# Patient Record
Sex: FEMALE | Race: WHITE | NOT HISPANIC OR LATINO | Employment: FULL TIME | URBAN - METROPOLITAN AREA
[De-identification: names, ages, dates, MRNs, and addresses within clinical notes are randomized per-mention and may not be internally consistent; named-entity substitution may affect disease eponyms.]

---

## 2022-12-29 ENCOUNTER — ANESTHESIA EVENT (OUTPATIENT)
Dept: PERIOP | Facility: HOSPITAL | Age: 42
End: 2022-12-29
Payer: COMMERCIAL

## 2022-12-29 ENCOUNTER — OFFICE VISIT (OUTPATIENT)
Dept: ORTHOPEDIC SURGERY | Facility: CLINIC | Age: 42
End: 2022-12-29

## 2022-12-29 ENCOUNTER — LAB (OUTPATIENT)
Dept: LAB | Facility: HOSPITAL | Age: 42
End: 2022-12-29
Payer: COMMERCIAL

## 2022-12-29 VITALS
HEART RATE: 93 BPM | DIASTOLIC BLOOD PRESSURE: 106 MMHG | WEIGHT: 170 LBS | BODY MASS INDEX: 31.28 KG/M2 | SYSTOLIC BLOOD PRESSURE: 156 MMHG | HEIGHT: 62 IN

## 2022-12-29 DIAGNOSIS — S82.61XA CLOSED DISPLACED FRACTURE OF LATERAL MALLEOLUS OF RIGHT FIBULA, INITIAL ENCOUNTER: ICD-10-CM

## 2022-12-29 DIAGNOSIS — S82.61XA CLOSED DISPLACED FRACTURE OF LATERAL MALLEOLUS OF RIGHT FIBULA, INITIAL ENCOUNTER: Primary | ICD-10-CM

## 2022-12-29 DIAGNOSIS — S93.431A ANKLE SYNDESMOSIS DISRUPTION, RIGHT, INITIAL ENCOUNTER: ICD-10-CM

## 2022-12-29 LAB
ABO GROUP BLD: NORMAL
ABO GROUP BLD: NORMAL
ANION GAP SERPL CALCULATED.3IONS-SCNC: 10.6 MMOL/L (ref 5–15)
BASOPHILS # BLD AUTO: 0.06 10*3/MM3 (ref 0–0.2)
BASOPHILS NFR BLD AUTO: 0.6 % (ref 0–1.5)
BLD GP AB SCN SERPL QL: NEGATIVE
BUN SERPL-MCNC: 7 MG/DL (ref 6–20)
BUN/CREAT SERPL: 9.9 (ref 7–25)
CALCIUM SPEC-SCNC: 9.1 MG/DL (ref 8.6–10.5)
CHLORIDE SERPL-SCNC: 102 MMOL/L (ref 98–107)
CO2 SERPL-SCNC: 25.4 MMOL/L (ref 22–29)
CREAT SERPL-MCNC: 0.71 MG/DL (ref 0.57–1)
DEPRECATED RDW RBC AUTO: 49.3 FL (ref 37–54)
EGFRCR SERPLBLD CKD-EPI 2021: 108.3 ML/MIN/1.73
EOSINOPHIL # BLD AUTO: 0.13 10*3/MM3 (ref 0–0.4)
EOSINOPHIL NFR BLD AUTO: 1.2 % (ref 0.3–6.2)
ERYTHROCYTE [DISTWIDTH] IN BLOOD BY AUTOMATED COUNT: 13.2 % (ref 12.3–15.4)
GLUCOSE SERPL-MCNC: 91 MG/DL (ref 65–99)
HCT VFR BLD AUTO: 38.7 % (ref 34–46.6)
HGB BLD-MCNC: 13 G/DL (ref 12–15.9)
IMM GRANULOCYTES # BLD AUTO: 0.11 10*3/MM3 (ref 0–0.05)
IMM GRANULOCYTES NFR BLD AUTO: 1 % (ref 0–0.5)
LYMPHOCYTES # BLD AUTO: 2.72 10*3/MM3 (ref 0.7–3.1)
LYMPHOCYTES NFR BLD AUTO: 25.9 % (ref 19.6–45.3)
MCH RBC QN AUTO: 34.5 PG (ref 26.6–33)
MCHC RBC AUTO-ENTMCNC: 33.6 G/DL (ref 31.5–35.7)
MCV RBC AUTO: 102.7 FL (ref 79–97)
MONOCYTES # BLD AUTO: 0.52 10*3/MM3 (ref 0.1–0.9)
MONOCYTES NFR BLD AUTO: 4.9 % (ref 5–12)
NEUTROPHILS NFR BLD AUTO: 6.98 10*3/MM3 (ref 1.7–7)
NEUTROPHILS NFR BLD AUTO: 66.4 % (ref 42.7–76)
NRBC BLD AUTO-RTO: 0 /100 WBC (ref 0–0.2)
PLATELET # BLD AUTO: 297 10*3/MM3 (ref 140–450)
PMV BLD AUTO: 9.2 FL (ref 6–12)
POTASSIUM SERPL-SCNC: 3.7 MMOL/L (ref 3.5–5.2)
QT INTERVAL: 412 MS
RBC # BLD AUTO: 3.77 10*6/MM3 (ref 3.77–5.28)
RH BLD: POSITIVE
RH BLD: POSITIVE
SODIUM SERPL-SCNC: 138 MMOL/L (ref 136–145)
T&S EXPIRATION DATE: NORMAL
WBC NRBC COR # BLD: 10.52 10*3/MM3 (ref 3.4–10.8)

## 2022-12-29 PROCEDURE — 93010 ELECTROCARDIOGRAM REPORT: CPT | Performed by: INTERNAL MEDICINE

## 2022-12-29 PROCEDURE — 86901 BLOOD TYPING SEROLOGIC RH(D): CPT | Performed by: INTERNAL MEDICINE

## 2022-12-29 PROCEDURE — 86900 BLOOD TYPING SEROLOGIC ABO: CPT | Performed by: INTERNAL MEDICINE

## 2022-12-29 PROCEDURE — 80048 BASIC METABOLIC PNL TOTAL CA: CPT

## 2022-12-29 PROCEDURE — 93005 ELECTROCARDIOGRAM TRACING: CPT | Performed by: INTERNAL MEDICINE

## 2022-12-29 PROCEDURE — 86901 BLOOD TYPING SEROLOGIC RH(D): CPT

## 2022-12-29 PROCEDURE — 86850 RBC ANTIBODY SCREEN: CPT | Performed by: INTERNAL MEDICINE

## 2022-12-29 PROCEDURE — 36415 COLL VENOUS BLD VENIPUNCTURE: CPT

## 2022-12-29 PROCEDURE — 85025 COMPLETE CBC W/AUTO DIFF WBC: CPT

## 2022-12-29 PROCEDURE — 86900 BLOOD TYPING SEROLOGIC ABO: CPT

## 2022-12-29 PROCEDURE — 99204 OFFICE O/P NEW MOD 45 MIN: CPT | Performed by: INTERNAL MEDICINE

## 2022-12-29 RX ORDER — ACETAMINOPHEN 325 MG/1
1000 TABLET ORAL ONCE
Status: CANCELLED | OUTPATIENT
Start: 2022-12-29 | End: 2022-12-29

## 2022-12-29 RX ORDER — PREGABALIN 150 MG/1
150 CAPSULE ORAL ONCE
Status: CANCELLED | OUTPATIENT
Start: 2022-12-29 | End: 2022-12-29

## 2022-12-29 RX ORDER — MELOXICAM 7.5 MG/1
15 TABLET ORAL ONCE
Status: CANCELLED | OUTPATIENT
Start: 2022-12-29 | End: 2022-12-29

## 2022-12-29 NOTE — PROGRESS NOTES
"Subjective:     Patient ID: Nazanin Telles is a 42 y.o. female.    Chief Complaint:    History of Present Illness  Nazanin Telles presents to clinic today for evaluation of right ankle pain and swelling after she slipped [place] [address].  Patient states that she hobbled around for a few days and eventually went to an urgent care.  She was seen in the urgent care yesterday and diagnosed with a lateral malleolar ankle fracture with syndesmosis widening.  She was placed in a cam boot made weightbearing as tolerated and told to follow-up with orthopedics.  She is here today for further evaluation and management.  Patient is quite anxious upon examination and is tearful tachycardic and tachypneic.     Social History     Occupational History   • Not on file   Tobacco Use   • Smoking status: Every Day     Packs/day: 1.00     Types: Cigarettes     Passive exposure: Never   • Smokeless tobacco: Never   Vaping Use   • Vaping Use: Never used   Substance and Sexual Activity   • Alcohol use: Defer   • Drug use: Never   • Sexual activity: Yes      Past Medical History:   Diagnosis Date   • Anxiety    • Disease of thyroid gland      Past Surgical History:   Procedure Laterality Date   • THYROID SURGERY         History reviewed. No pertinent family history.              Objective:  Vitals:    12/29/22 1328   BP: (!) 156/106   BP Location: Right arm   Pulse: 93   Weight: 77.1 kg (170 lb)   Height: 157.5 cm (62\")         12/29/22  1328   Weight: 77.1 kg (170 lb)     Body mass index is 31.09 kg/m².        Right Ankle Exam     Tenderness   The patient is experiencing tenderness in the lateral malleolus.  Swelling: moderate    Range of Motion   Dorsiflexion: abnormal   Plantar flexion: abnormal   Eversion: abnormal   Inversion: abnormal     Muscle Strength   Dorsiflexion:  3/5  Plantar flexion:  3/5  Anterior tibial:  3/5  Posterior tibial:  3/5  Gastrocsoleus:  3/5  Peroneal muscle:  3/5    Other   Erythema: " absent  Scars: absent  Sensation: normal  Pulse: present                Imaging: 3 views of the right ankle taken in the urgent care yesterday were reviewed myself in the office today  Indication: Right ankle pain  Findings: X-rays demonstrate a supination external rotation type fibular fracture with widening of the medial clear space and syndesmosis.  No other acute osseous abnormalities appreciated.  Medial malleolus and posterior malleolus appear intact  Comparative studies: None  XR Ankle 3+ View Right    Result Date: 12/28/2022  1. Slightly displaced oblique lateral malleolus fracture. Lund B. 2. Slight widening of the medial ankle mortise on the AP radiograph only. Possible deltoid ligamentous injury. 3. Lateral ankle soft tissue swelling Signer Name: Matt Davis MD  Signed: 12/28/2022 10:57 AM  Workstation Name: [id number]  [facility]    XR Foot 3+ View Right    Result Date: 12/28/2022  Negative right foot series.  This report was finalized on 12/28/2022 11:05 AM by Dr. Junior Nelson MD.      Assessment:        1. Closed displaced fracture of lateral malleolus of right fibula, initial encounter    2. Ankle syndesmosis disruption, right, initial encounter           Plan:          1. Discussed treatment options at length with patient at today's visit.  I discussed that the patient has sustained an unstable ankle fracture with syndesmosis and medial clear space widening.  I recommended surgical intervention to repair her fibula and possibly her syndesmosis.  I discussed the risk benefits alternatives of surgical fixation including but not limited to infection, bleeding, damage to surrounding structures including arteries veins and nerves, foot weakness foot numbness malunion nonunion implant irritation DVT and potential need for more surgery.  The patient and her  voiced understanding of the risk benefits alternatives of surgical fixation would like to proceed with  surgery.  We will plan for open reduction internal fixation right fibula with possible syndesmosis reconstruction tomorrow at 730.  I will send the patient today for preadmission testing for labs and EKG.  2. Follow up: 2 weeks following surgery      Nazanin Telles was in agreement with plan and had all questions answered.     Medications:  No orders of the defined types were placed in this encounter.      Followup:  No follow-ups on file.    Diagnoses and all orders for this visit:    1. Closed displaced fracture of lateral malleolus of right fibula, initial encounter (Primary)  -     Case Request; Standing  -     acetaminophen (TYLENOL) tablet 975 mg  -     meloxicam (MOBIC) tablet 15 mg  -     pregabalin (LYRICA) capsule 150 mg  -     CBC and Differential; Future  -     Basic metabolic panel; Future  -     Type and screen; Future  -     ECG 12 Lead; Future  -     ceFAZolin (ANCEF) 2 g in sodium chloride 0.9 % 100 mL IVPB  -     Case Request    2. Ankle syndesmosis disruption, right, initial encounter  -     Case Request; Standing  -     acetaminophen (TYLENOL) tablet 975 mg  -     meloxicam (MOBIC) tablet 15 mg  -     pregabalin (LYRICA) capsule 150 mg  -     CBC and Differential; Future  -     Basic metabolic panel; Future  -     Type and screen; Future  -     ECG 12 Lead; Future  -     ceFAZolin (ANCEF) 2 g in sodium chloride 0.9 % 100 mL IVPB  -     Case Request    Other orders  -     Follow Anesthesia Guidelines / Protocol; Future  -     Follow Anesthesia Guidelines / Protocol; Standing  -     Nerve Block; Standing  -     Verify NPO Status; Standing  -     SCD (sequential compression device)- to be placed on patient in Pre-op; Standing  -     Obtain informed consent (if not collected inpatient or PAT); Standing  -     Radiology Technician to Be Present for Intra-Op C-Arm Use; Standing          Dictated utilizing Dragon dictation

## 2022-12-30 ENCOUNTER — APPOINTMENT (OUTPATIENT)
Dept: GENERAL RADIOLOGY | Facility: HOSPITAL | Age: 42
End: 2022-12-30
Payer: COMMERCIAL

## 2022-12-30 ENCOUNTER — ANESTHESIA (OUTPATIENT)
Dept: PERIOP | Facility: HOSPITAL | Age: 42
End: 2022-12-30
Payer: COMMERCIAL

## 2022-12-30 ENCOUNTER — HOSPITAL ENCOUNTER (OUTPATIENT)
Facility: HOSPITAL | Age: 42
Setting detail: HOSPITAL OUTPATIENT SURGERY
Discharge: HOME | End: 2022-12-30
Attending: INTERNAL MEDICINE | Admitting: INTERNAL MEDICINE
Payer: COMMERCIAL

## 2022-12-30 VITALS
RESPIRATION RATE: 20 BRPM | BODY MASS INDEX: 36.29 KG/M2 | TEMPERATURE: 97.6 F | WEIGHT: 197.2 LBS | HEIGHT: 62 IN | HEART RATE: 78 BPM | SYSTOLIC BLOOD PRESSURE: 103 MMHG | DIASTOLIC BLOOD PRESSURE: 83 MMHG | OXYGEN SATURATION: 98 %

## 2022-12-30 DIAGNOSIS — S82.61XA CLOSED DISPLACED FRACTURE OF LATERAL MALLEOLUS OF RIGHT FIBULA, INITIAL ENCOUNTER: ICD-10-CM

## 2022-12-30 DIAGNOSIS — S93.431A ANKLE SYNDESMOSIS DISRUPTION, RIGHT, INITIAL ENCOUNTER: ICD-10-CM

## 2022-12-30 LAB — HCG SERPL QL: NEGATIVE

## 2022-12-30 PROCEDURE — C1713 ANCHOR/SCREW BN/BN,TIS/BN: HCPCS | Performed by: INTERNAL MEDICINE

## 2022-12-30 PROCEDURE — 25010000002 PROPOFOL 1000 MG/100ML EMULSION: Performed by: NURSE ANESTHETIST, CERTIFIED REGISTERED

## 2022-12-30 PROCEDURE — 76942 ECHO GUIDE FOR BIOPSY: CPT | Performed by: INTERNAL MEDICINE

## 2022-12-30 PROCEDURE — 25010000002 DEXAMETHASONE PER 1 MG: Performed by: NURSE ANESTHETIST, CERTIFIED REGISTERED

## 2022-12-30 PROCEDURE — S0260 H&P FOR SURGERY: HCPCS | Performed by: INTERNAL MEDICINE

## 2022-12-30 PROCEDURE — 25010000002 MIDAZOLAM PER 1MG: Performed by: NURSE ANESTHETIST, CERTIFIED REGISTERED

## 2022-12-30 PROCEDURE — 84703 CHORIONIC GONADOTROPIN ASSAY: CPT | Performed by: INTERNAL MEDICINE

## 2022-12-30 PROCEDURE — 27829 TREAT LOWER LEG JOINT: CPT | Performed by: INTERNAL MEDICINE

## 2022-12-30 PROCEDURE — 27792 TREATMENT OF ANKLE FRACTURE: CPT | Performed by: INTERNAL MEDICINE

## 2022-12-30 PROCEDURE — 27829 TREAT LOWER LEG JOINT: CPT | Performed by: SPECIALIST/TECHNOLOGIST, OTHER

## 2022-12-30 PROCEDURE — 25010000002 ONDANSETRON PER 1 MG: Performed by: NURSE ANESTHETIST, CERTIFIED REGISTERED

## 2022-12-30 PROCEDURE — 25010000002 EPINEPHRINE (ANAPHYLAXIS) 1 MG/ML SOLUTION: Performed by: NURSE ANESTHETIST, CERTIFIED REGISTERED

## 2022-12-30 PROCEDURE — 0 CEFAZOLIN SODIUM-DEXTROSE 2-3 GM-%(50ML) RECONSTITUTED SOLUTION: Performed by: INTERNAL MEDICINE

## 2022-12-30 PROCEDURE — 73600 X-RAY EXAM OF ANKLE: CPT

## 2022-12-30 DEVICE — EVOS 3.5MM X 10MM CORTEX SCREW SELF-TAPPING
Type: IMPLANTABLE DEVICE | Site: ANKLE | Status: FUNCTIONAL
Brand: EVOS

## 2022-12-30 DEVICE — EVOS 3.5MM LATERAL DISTAL FIBULA                                    PLATE 7 HOLE RIGHT 103MM
Type: IMPLANTABLE DEVICE | Site: ANKLE | Status: FUNCTIONAL
Brand: EVOS

## 2022-12-30 DEVICE — EVOS 3.5MM X 14MM LOCKING SCREW SELF-TAPPING
Type: IMPLANTABLE DEVICE | Site: ANKLE | Status: FUNCTIONAL
Brand: EVOS

## 2022-12-30 DEVICE — EVOS 3.5MM X 10MM LOCKING SCREW SELF-TAPPING
Type: IMPLANTABLE DEVICE | Site: ANKLE | Status: FUNCTIONAL
Brand: EVOS

## 2022-12-30 DEVICE — EVOS 3.5MM X 12MM CORTEX SCREW SELF-TAPPING
Type: IMPLANTABLE DEVICE | Site: ANKLE | Status: FUNCTIONAL
Brand: EVOS

## 2022-12-30 DEVICE — EVOS 3.5MM X 16MM LOCKING SCREW SELF-TAPPING
Type: IMPLANTABLE DEVICE | Site: ANKLE | Status: FUNCTIONAL
Brand: EVOS

## 2022-12-30 DEVICE — KIT INVISIKNOT ANKLE FRACTURE
Type: IMPLANTABLE DEVICE | Site: ANKLE | Status: FUNCTIONAL
Brand: INVISIKNOT

## 2022-12-30 RX ORDER — BUPIVACAINE HYDROCHLORIDE 5 MG/ML
INJECTION, SOLUTION EPIDURAL; INTRACAUDAL
Status: COMPLETED | OUTPATIENT
Start: 2022-12-30 | End: 2022-12-30

## 2022-12-30 RX ORDER — AMOXICILLIN AND CLAVULANATE POTASSIUM 562.5; 437.5; 62.5 MG/1; MG/1; MG/1
2 TABLET, FILM COATED, EXTENDED RELEASE ORAL 2 TIMES DAILY
Qty: 40 TABLET | Refills: 0 | Status: SHIPPED | OUTPATIENT
Start: 2022-12-30 | End: 2022-12-30 | Stop reason: SDUPTHER

## 2022-12-30 RX ORDER — CEFAZOLIN SODIUM 2 G/50ML
2 SOLUTION INTRAVENOUS ONCE
Status: COMPLETED | OUTPATIENT
Start: 2022-12-30 | End: 2022-12-30

## 2022-12-30 RX ORDER — DOCUSATE SODIUM 250 MG
250 CAPSULE ORAL DAILY
Qty: 30 CAPSULE | Refills: 0 | Status: SHIPPED | OUTPATIENT
Start: 2022-12-30 | End: 2023-01-31

## 2022-12-30 RX ORDER — DEXMEDETOMIDINE HYDROCHLORIDE 100 UG/ML
INJECTION, SOLUTION INTRAVENOUS AS NEEDED
Status: DISCONTINUED | OUTPATIENT
Start: 2022-12-30 | End: 2022-12-30 | Stop reason: SURG

## 2022-12-30 RX ORDER — GLYCOPYRROLATE 0.2 MG/ML
INJECTION INTRAMUSCULAR; INTRAVENOUS AS NEEDED
Status: DISCONTINUED | OUTPATIENT
Start: 2022-12-30 | End: 2022-12-30 | Stop reason: SURG

## 2022-12-30 RX ORDER — PREGABALIN 75 MG/1
150 CAPSULE ORAL ONCE
Status: COMPLETED | OUTPATIENT
Start: 2022-12-30 | End: 2022-12-30

## 2022-12-30 RX ORDER — ACETAMINOPHEN 500 MG
1000 TABLET ORAL ONCE
Status: COMPLETED | OUTPATIENT
Start: 2022-12-30 | End: 2022-12-30

## 2022-12-30 RX ORDER — LIDOCAINE HYDROCHLORIDE 10 MG/ML
0.5 INJECTION, SOLUTION EPIDURAL; INFILTRATION; INTRACAUDAL; PERINEURAL ONCE AS NEEDED
Status: DISCONTINUED | OUTPATIENT
Start: 2022-12-30 | End: 2022-12-30 | Stop reason: HOSPADM

## 2022-12-30 RX ORDER — LIDOCAINE HYDROCHLORIDE 20 MG/ML
INJECTION, SOLUTION INTRAVENOUS AS NEEDED
Status: DISCONTINUED | OUTPATIENT
Start: 2022-12-30 | End: 2022-12-30 | Stop reason: SURG

## 2022-12-30 RX ORDER — LEVOTHYROXINE SODIUM 112 UG/1
112 TABLET ORAL DAILY
Qty: 30 TABLET | Refills: 0 | Status: SHIPPED | OUTPATIENT
Start: 2022-12-30 | End: 2023-01-31 | Stop reason: SDUPTHER

## 2022-12-30 RX ORDER — KETAMINE HYDROCHLORIDE 10 MG/ML
INJECTION INTRAMUSCULAR; INTRAVENOUS AS NEEDED
Status: DISCONTINUED | OUTPATIENT
Start: 2022-12-30 | End: 2022-12-30 | Stop reason: SURG

## 2022-12-30 RX ORDER — AMOXICILLIN AND CLAVULANATE POTASSIUM 562.5; 437.5; 62.5 MG/1; MG/1; MG/1
2 TABLET, FILM COATED, EXTENDED RELEASE ORAL 2 TIMES DAILY
Qty: 40 TABLET | Refills: 0 | Status: SHIPPED | OUTPATIENT
Start: 2022-12-30 | End: 2023-01-31

## 2022-12-30 RX ORDER — MIDAZOLAM HYDROCHLORIDE 2 MG/2ML
1 INJECTION, SOLUTION INTRAMUSCULAR; INTRAVENOUS
Status: COMPLETED | OUTPATIENT
Start: 2022-12-30 | End: 2022-12-30

## 2022-12-30 RX ORDER — FAMOTIDINE 10 MG/ML
20 INJECTION, SOLUTION INTRAVENOUS
Status: COMPLETED | OUTPATIENT
Start: 2022-12-30 | End: 2022-12-30

## 2022-12-30 RX ORDER — MELOXICAM 7.5 MG/1
15 TABLET ORAL ONCE
Status: COMPLETED | OUTPATIENT
Start: 2022-12-30 | End: 2022-12-30

## 2022-12-30 RX ORDER — OXYCODONE HYDROCHLORIDE AND ACETAMINOPHEN 5; 325 MG/1; MG/1
1 TABLET ORAL ONCE AS NEEDED
Status: DISCONTINUED | OUTPATIENT
Start: 2022-12-30 | End: 2022-12-30 | Stop reason: HOSPADM

## 2022-12-30 RX ORDER — ONDANSETRON 2 MG/ML
4 INJECTION INTRAMUSCULAR; INTRAVENOUS ONCE AS NEEDED
Status: COMPLETED | OUTPATIENT
Start: 2022-12-30 | End: 2022-12-30

## 2022-12-30 RX ORDER — DEXAMETHASONE SODIUM PHOSPHATE 4 MG/ML
8 INJECTION, SOLUTION INTRA-ARTICULAR; INTRALESIONAL; INTRAMUSCULAR; INTRAVENOUS; SOFT TISSUE ONCE AS NEEDED
Status: COMPLETED | OUTPATIENT
Start: 2022-12-30 | End: 2022-12-30

## 2022-12-30 RX ORDER — ONDANSETRON 2 MG/ML
4 INJECTION INTRAMUSCULAR; INTRAVENOUS ONCE AS NEEDED
Status: DISCONTINUED | OUTPATIENT
Start: 2022-12-30 | End: 2022-12-30 | Stop reason: HOSPADM

## 2022-12-30 RX ORDER — EPINEPHRINE 1 MG/ML
INJECTION, SOLUTION INTRAMUSCULAR; SUBCUTANEOUS AS NEEDED
Status: DISCONTINUED | OUTPATIENT
Start: 2022-12-30 | End: 2022-12-30 | Stop reason: SURG

## 2022-12-30 RX ORDER — SODIUM CHLORIDE 0.9 % (FLUSH) 0.9 %
10 SYRINGE (ML) INJECTION EVERY 12 HOURS SCHEDULED
Status: DISCONTINUED | OUTPATIENT
Start: 2022-12-30 | End: 2022-12-30 | Stop reason: HOSPADM

## 2022-12-30 RX ORDER — FENTANYL CITRATE 50 UG/ML
50 INJECTION, SOLUTION INTRAMUSCULAR; INTRAVENOUS
Status: DISCONTINUED | OUTPATIENT
Start: 2022-12-30 | End: 2022-12-30 | Stop reason: HOSPADM

## 2022-12-30 RX ORDER — SODIUM CHLORIDE, SODIUM LACTATE, POTASSIUM CHLORIDE, CALCIUM CHLORIDE 600; 310; 30; 20 MG/100ML; MG/100ML; MG/100ML; MG/100ML
9 INJECTION, SOLUTION INTRAVENOUS CONTINUOUS PRN
Status: DISCONTINUED | OUTPATIENT
Start: 2022-12-30 | End: 2022-12-30 | Stop reason: HOSPADM

## 2022-12-30 RX ORDER — SODIUM CHLORIDE 9 MG/ML
40 INJECTION, SOLUTION INTRAVENOUS AS NEEDED
Status: DISCONTINUED | OUTPATIENT
Start: 2022-12-30 | End: 2022-12-30 | Stop reason: HOSPADM

## 2022-12-30 RX ORDER — CHOLECALCIFEROL (VITAMIN D3) 125 MCG
20 CAPSULE ORAL NIGHTLY
COMMUNITY

## 2022-12-30 RX ORDER — SODIUM CHLORIDE 0.9 % (FLUSH) 0.9 %
10 SYRINGE (ML) INJECTION AS NEEDED
Status: DISCONTINUED | OUTPATIENT
Start: 2022-12-30 | End: 2022-12-30 | Stop reason: HOSPADM

## 2022-12-30 RX ORDER — ASPIRIN 81 MG/1
81 TABLET ORAL 2 TIMES DAILY
Qty: 60 TABLET | Refills: 0 | Status: SHIPPED | OUTPATIENT
Start: 2022-12-30 | End: 2023-01-31

## 2022-12-30 RX ORDER — CYCLOBENZAPRINE HCL 5 MG
5 TABLET ORAL 3 TIMES DAILY PRN
Qty: 30 TABLET | Refills: 0 | Status: SHIPPED | OUTPATIENT
Start: 2022-12-30 | End: 2023-01-12

## 2022-12-30 RX ORDER — PROPOFOL 10 MG/ML
INJECTION, EMULSION INTRAVENOUS CONTINUOUS PRN
Status: DISCONTINUED | OUTPATIENT
Start: 2022-12-30 | End: 2022-12-30 | Stop reason: SURG

## 2022-12-30 RX ORDER — OXYCODONE HYDROCHLORIDE AND ACETAMINOPHEN 5; 325 MG/1; MG/1
1-2 TABLET ORAL EVERY 4 HOURS PRN
Qty: 45 TABLET | Refills: 0 | Status: SHIPPED | OUTPATIENT
Start: 2022-12-30 | End: 2023-01-06 | Stop reason: SDUPTHER

## 2022-12-30 RX ORDER — MAGNESIUM HYDROXIDE 1200 MG/15ML
LIQUID ORAL AS NEEDED
Status: DISCONTINUED | OUTPATIENT
Start: 2022-12-30 | End: 2022-12-30 | Stop reason: HOSPADM

## 2022-12-30 RX ADMIN — MIDAZOLAM HYDROCHLORIDE 1 MG: 1 INJECTION, SOLUTION INTRAMUSCULAR; INTRAVENOUS at 07:09

## 2022-12-30 RX ADMIN — KETAMINE HYDROCHLORIDE 10 MG: 10 INJECTION INTRAMUSCULAR; INTRAVENOUS at 07:50

## 2022-12-30 RX ADMIN — FAMOTIDINE 20 MG: 10 INJECTION, SOLUTION INTRAVENOUS at 07:05

## 2022-12-30 RX ADMIN — DEXAMETHASONE SODIUM PHOSPHATE 8 MG: 4 INJECTION, SOLUTION INTRAMUSCULAR; INTRAVENOUS at 07:04

## 2022-12-30 RX ADMIN — PROPOFOL INJECTABLE EMULSION 80 MCG/KG/MIN: 10 INJECTION, EMULSION INTRAVENOUS at 07:37

## 2022-12-30 RX ADMIN — BUPIVACAINE HYDROCHLORIDE 20 ML: 5 INJECTION, SOLUTION EPIDURAL; INTRACAUDAL; PERINEURAL at 07:12

## 2022-12-30 RX ADMIN — ACETAMINOPHEN 1000 MG: 500 TABLET, FILM COATED ORAL at 06:19

## 2022-12-30 RX ADMIN — GLYCOPYRROLATE 0.1 MG: 0.2 INJECTION INTRAMUSCULAR; INTRAVENOUS at 07:48

## 2022-12-30 RX ADMIN — SODIUM CHLORIDE, POTASSIUM CHLORIDE, SODIUM LACTATE AND CALCIUM CHLORIDE 9 ML/HR: 600; 310; 30; 20 INJECTION, SOLUTION INTRAVENOUS at 07:05

## 2022-12-30 RX ADMIN — MIDAZOLAM HYDROCHLORIDE 1 MG: 1 INJECTION, SOLUTION INTRAMUSCULAR; INTRAVENOUS at 07:06

## 2022-12-30 RX ADMIN — KETAMINE HYDROCHLORIDE 10 MG: 10 INJECTION INTRAMUSCULAR; INTRAVENOUS at 07:12

## 2022-12-30 RX ADMIN — MELOXICAM 15 MG: 7.5 TABLET ORAL at 06:19

## 2022-12-30 RX ADMIN — PREGABALIN 150 MG: 75 CAPSULE ORAL at 06:19

## 2022-12-30 RX ADMIN — LIDOCAINE HYDROCHLORIDE 2 ML: 20 INJECTION, SOLUTION INTRAVENOUS at 07:22

## 2022-12-30 RX ADMIN — BUPIVACAINE HYDROCHLORIDE 10 ML: 5 INJECTION, SOLUTION EPIDURAL; INTRACAUDAL at 07:24

## 2022-12-30 RX ADMIN — LIDOCAINE HYDROCHLORIDE 2 ML: 20 INJECTION, SOLUTION INTRAVENOUS at 07:12

## 2022-12-30 RX ADMIN — CEFAZOLIN SODIUM 2 G: 2 SOLUTION INTRAVENOUS at 07:37

## 2022-12-30 RX ADMIN — KETAMINE HYDROCHLORIDE 10 MG: 10 INJECTION INTRAMUSCULAR; INTRAVENOUS at 07:37

## 2022-12-30 RX ADMIN — DEXMEDETOMIDINE 50 MCG: 100 INJECTION, SOLUTION, CONCENTRATE INTRAVENOUS at 07:24

## 2022-12-30 RX ADMIN — EPINEPHRINE 75 MCG: 1 INJECTION INTRAMUSCULAR; INTRAVENOUS; SUBCUTANEOUS at 07:24

## 2022-12-30 RX ADMIN — ONDANSETRON 4 MG: 2 INJECTION INTRAMUSCULAR; INTRAVENOUS at 07:04

## 2022-12-30 RX ADMIN — DEXMEDETOMIDINE 8 MCG: 100 INJECTION, SOLUTION, CONCENTRATE INTRAVENOUS at 07:56

## 2022-12-30 NOTE — ANESTHESIA PREPROCEDURE EVALUATION
Anesthesia Evaluation     Patient summary reviewed and Nursing notes reviewed   no history of anesthetic complications:  NPO Solid Status: > 8 hours  NPO Liquid Status: < 2 hours           Airway   Mallampati: II  TM distance: <3 FB  Neck ROM: full  No difficulty expected  Dental - normal exam     Pulmonary - normal exam   (+) a smoker Current Smoked day of surgery,   (-) shortness of breath  Cardiovascular - normal exam  Exercise tolerance: good (4-7 METS)    ECG reviewed    (-) angina      Neuro/Psych  (+) psychiatric history Anxiety,    GI/Hepatic/Renal/Endo    (+)  GERD well controlled,  diabetes mellitus gestational, thyroid problem hypothyroidism    Musculoskeletal (-) negative ROS    Abdominal    Substance History   (+) alcohol use, drug use (MJ use )     OB/GYN negative ob/gyn ROS         Other                        Anesthesia Plan    ASA 2     general with block     intravenous induction     Anesthetic plan, risks, benefits, and alternatives have been provided, discussed and informed consent has been obtained with: patient.    Use of blood products discussed with patient  Consented to blood products.       CODE STATUS:

## 2022-12-30 NOTE — ANESTHESIA POSTPROCEDURE EVALUATION
Patient: Nazanin Telles    Procedure Summary     Date: 12/30/22 Room / Location:  [facility] /  [facility]    Anesthesia Start: 0730 Anesthesia Stop: 0854    Procedure: ANKLE OPEN REDUCTION INTERNAL FIXATION, possible syndesmosis fixation, all associated procedures (Right: Ankle) Diagnosis:       Closed displaced fracture of lateral malleolus of right fibula, initial encounter      Ankle syndesmosis disruption, right, initial encounter      (Closed displaced fracture of lateral malleolus of right fibula, initial encounter [S82.61XA])      (Ankle syndesmosis disruption, right, initial encounter [S93.431A])    Surgeons: Ross Sol MD Provider: Goyo Ann CRNA    Anesthesia Type: general with block ASA Status: 2          Anesthesia Type: general with block    Vitals  Vitals Value Taken Time   BP     Temp 97.6 °F (36.4 °C) 12/30/22 0852   Pulse     Resp     SpO2             Post Anesthesia Care and Evaluation    Patient location during evaluation: bedside  Patient participation: complete - patient participated  Level of consciousness: awake and alert  Pain score: 0  Pain management: adequate    Airway patency: patent  Anesthetic complications: No anesthetic complications  PONV Status: none  Cardiovascular status: acceptable  Respiratory status: acceptable  Hydration status: acceptable

## 2022-12-30 NOTE — H&P
[facility]   HISTORY AND PHYSICAL    Patient Name: Nazanin Telles  : 1980  MRN: [id number]  Primary Care Physician:  Carlos Mendez MD  Date of admission: 2022    Subjective   Subjective     Chief Complaint: r ankle pain    History of Present Illness  Ms. Telles is a anxious 42-year-old female who [place] a [place] ankle [place] [address] when she slipped on the ice.  She states she tried to treat her ankle injury conservatively however 2 days ago the pain and swelling got so severe that they decided to go to an urgent care for an x-ray.  She was diagnosed with an unstable ankle fracture she was placed in a cam boot and sent to my office yesterday.  She was evaluated by me in the office and due to her fracture morphology and widening of her syndesmosis and medial clear space the decision was made to proceed with surgery.  She is here today for surgical intervention of her right ankle fracture.      Review of Systems   Musculoskeletal: Positive for arthralgias, gait problem and joint swelling.   Skin: Positive for color change.   Psychiatric/Behavioral: The patient is nervous/anxious.    All other systems reviewed and are negative.       Personal History     Past Medical History:   Diagnosis Date   • Anxiety    • Disease of thyroid gland        Past Surgical History:   Procedure Laterality Date   • THYROID SURGERY         Family History: family history is not on file. Otherwise pertinent FHx was reviewed and not pertinent to current issue.    Social History:  reports that she has been smoking cigarettes. She has been smoking an average of 1 pack per day. She has never been exposed to tobacco smoke. She has never used smokeless tobacco. Alcohol use questions deferred to the physician. She reports that she does not use drugs.    Home Medications:  HYDROcodone-acetaminophen, hydrOXYzine, levothyroxine, and melatonin    Allergies:  No Known Allergies    Objective    Objective      Vitals:   Temp:  [98.2 °F (36.8 °C)] 98.2 °F (36.8 °C)  Heart Rate:  [78-93] 78  Resp:  [20] 20  BP: (125-156)/() 125/81    Physical Exam   Right Ankle Exam      Tenderness   The patient is experiencing tenderness in the lateral malleolus.  Swelling: moderate     Range of Motion   Dorsiflexion: abnormal   Plantar flexion: abnormal   Eversion: abnormal   Inversion: abnormal      Muscle Strength   Dorsiflexion:  3/5  Plantar flexion:  3/5  Anterior tibial:  3/5  Posterior tibial:  3/5  Gastrocsoleus:  3/5  Peroneal muscle:  3/5     Other   Erythema: absent  Scars: absent  Sensation: normal  Pulse: present       Result Review    Result Review:  I have personally reviewed the results from the time of this admission to 12/30/2022 06:43 EST and agree with these findings:  [x]  Laboratory list / accordion  []  Microbiology  []  Radiology  []  EKG/Telemetry   []  Cardiology/Vascular   []  Pathology  []  Old records  [x]  Other:  Most notable findings include: 3 views of the right ankle show a supination external rotation type fibular fracture with widening of the syndesmosis lateral migration of the talus on the tibial plafond and widening of the medial clear space.      Assessment & Plan   Assessment / Plan     Brief Patient Summary:  Nazanin Telles is a 42 y.o. female who sustained an unstable ankle fracture.    Active Hospital Problems:  Active Hospital Problems    Diagnosis    • Closed displaced fracture of lateral malleolus of right fibula    • Ankle syndesmosis disruption, right, initial encounter      Plan:   1. Discussed treatment options at length with the patient she has has sustained an unstable ankle fracture with syndesmosis and medial clear space widening.  I recommended surgical intervention to repair her fibula and possibly her syndesmosis.  I discussed the risk benefits alternatives of surgical fixation including but not limited to infection, bleeding, damage to surrounding structures  including arteries veins and nerves, foot weakness foot numbness malunion nonunion implant irritation DVT and potential need for more surgery.  The patient and her  voiced understanding of the risk benefits alternatives of surgical fixation would like to proceed with surgery.  We will plan for open reduction internal fixation right fibula with possible syndesmosis reconstruction this morning.  Pre op block by anesthesia.  Plan for post op discharge today.     DVT prophylaxis:  Mechanical DVT prophylaxis orders are present.    CODE STATUS:       Admission Status:  I believe this patient meets outpatient status.    Ross Sol MD

## 2022-12-30 NOTE — ANESTHESIA PROCEDURE NOTES
Peripheral Block      Patient reassessed immediately prior to procedure    Patient location during procedure: pre-op  Start time: 12/30/2022 7:22 AM  Stop time: 12/30/2022 7:24 AM  Reason for block: at surgeon's request and post-op pain management  Performed by  CRNA/CAA: Goyo Ann CRNA  Preanesthetic Checklist  Completed: patient identified, IV checked, site marked, risks and benefits discussed, surgical consent, monitors and equipment checked, pre-op evaluation and timeout performed  Prep:  Pt Position: supine  Sterile barriers:cap, gloves, gown, mask and sterile barriers  Prep: ChloraPrep  Patient monitoring: blood pressure monitoring, continuous pulse oximetry and EKG  Procedure    Sedation: yes  Performed under: local infiltration  Guidance:ultrasound guided    ULTRASOUND INTERPRETATION.  Using ultrasound guidance a 21 G gauge needle was placed in close proximity to the nerve, at which point, under ultrasound guidance anesthetic was injected in the area of the nerve and spread of the anesthesia was seen on ultrasound in close proximity thereto.  There were no abnormalities seen on ultrasound; a digital image was taken; and the patient tolerated the procedure with no complications. Images:still images obtained, printed/placed on chart    Laterality:right  Block Type:adductor canal block  Injection Technique:single-shot  Needle Type:echogenic  Needle Gauge:21 G  Resistance on Injection: none    Medications Used: bupivacaine PF (MARCAINE) injection 0.5% - Injection   10 mL - 12/30/2022 7:24:00 AM      Medications  Preservative Free Saline:5ml    Post Assessment  Injection Assessment: negative aspiration for heme, no paresthesia on injection and incremental injection  Patient Tolerance:comfortable throughout block  Complications:no

## 2022-12-30 NOTE — DISCHARGE INSTRUCTIONS
Post Operative Ankle Surgery Orders/Instructions:     I. ACTIVITIES:  1. Exercises/Activities of Daily Living:  Strict NON-WEIGHTBEARING status.  Use crutches or walker for mobilization  Ice and elevate the operative extremity above level of heart  No tub baths, hot tubs, or swimming pools  Your surgeon will discuss with you when you will be able to drive again.  Everyone that comes near you should wash their hands  Avoid sick people.     IV. INCISION CARE:  Do NOT remove the dressing  Keep dressing DRY  No creams or ointments to the incision  Check dressing every day and notify surgeon immediately:  If any significant drainage   Increase in swelling of the extremity  Increase in pain  Increase in overall body temperature (greater than 100.5 degrees)    V. Medications:   1. Stool Softeners: You will be at greater risk of constipation after surgery due to being less mobile and the pain medications.   Take stool softeners as instructed by your surgeon while on pain medications. Over the counter Colace 100 mg 1-2 capsules twice daily.   If stools become too loose or too frequent, please decreases the dosage or stop the stool softener.  If constipation occurs despite use of stool softeners, you are to continue the stool softeners and add a laxative (Milk of Magnesia 1 ounce daily as needed)  Drink plenty of fluids, and eat fruits and vegetables during your recovery time        3. Pain Medications utilized after surgery are narcotics and the law requires that the following information be given to all patients that are prescribed narcotics:  CLASSIFICATION: Pain medications are called Opioids and are narcotics  LEGALITIES: It is illegal to share narcotics with others and to drive within 24 hours of taking narcotics  POTENTIAL SIDE EFFECTS: Potential side effects of opioids include: nausea, vomiting, itching, dizziness, drowsiness, dry mouth, constipation, and difficulty urinating.  POTENTIAL ADVERSE EFFECTS:   Opioid  tolerance can develop with use of pain medications and this simply means that it requires more and more of the medication to control pain; however, this is seen more in patients that use opioids for longer periods of time.  Opioid dependence can develop with use of Opioids and this simply means that to stop the medication can cause withdrawal symptoms; however, this is seen with patients that use Opioids for longer periods of time.  Opioid addiction can develop with use of Opioids and the incidence of this is very unlikely in patients who take the medications as ordered and stop the medications as instructed.  Opioid overdose can be dangerous, but is unlikely when the medication is taken as ordered and stopped when ordered. It is important not to mix opioids with alcohol or with and type of sedative such as Benadryl as this can lead to over sedation and respiratory difficulty.  DOSAGE:   Pain medications will need to be taken consistently for the first week to decrease pain and promote adequate pain relief and participation in physical therapy.  After the initial surgical pain begins to resolve, you may begin to decrease the pain medication. By the end of 6 weeks, you should be off of pain medications.  Refills will not be given by the office during evening hours, on weekends, or after 12 weeks post-op.  To seek refills on pain medications during the initial 6 week post-operative period, you must call the office 48 hours in advance to request the refill. The office will then notify you when to  the prescription. DO NOT wait until you are out of the medication to request a refill.      V. FOLLOW-UP VISITS:  You will need to follow up in the office with your surgeon in 10-14 days. Please call this number [phone] to schedule this appointment.  If you have any concerns or suspected complications prior to your follow up visit, please call your surgeons office. Do not wait until your appointment time if you  suspect complications. These will need to be addressed in the office promptly.

## 2022-12-30 NOTE — OP NOTE
Date of Operation: 12/30/22     PREOPERATIVE DIAGNOSIS:   Right distal fibula fracture  Right ankle syndesmosis disruption      POSTOPERATIVE DIAGNOSIS:   Right distal fibula fracture  Right ankle syndesmosis disruption         PROCEDURE PERFORMED:   Open reduction, internal fixation of right distal fibula fracture.    Repair/reconstruction syndesmosis right ankle     SURGEON:   Ross Sol MD     ASSISTANT: Chad Osborn was responsible for performing the following activities: Retraction, Closing and Placing Dressing and their skilled assistance was necessary for the success of this case.      ANESTHESIA: General endotracheal anesthesia with regional block.       ESTIMATED BLOOD LOSS:  minimal       FLUIDS: Per anesthesia.       COMPLICATIONS: None.       SPECIMENS: None.       DRAINS: None.     No Tourniquet Times Documented      IMPLANTS:   Smith and Nephew 7 hole lateral anatomic plate  Cali and Nephew syndesmotic tight rope     INDICATIONS FOR PROCEDURE: The patient is a pleasant 42 y.o. female with significant history of right lateral malleolus fracture sustained on 12/24/22 while walking and she slipped on the ice. I discussed treatment options available to the patient including closed treatment versus open reduction, internal fixation, and patient wished to proceed with surgical treatment given displacement of the fracture site, as well as concerns for nonunion. I explained details of the procedure, as well as the risks, benefits, and alternatives as documented on history and physical, and the patient had all questions answered prior to signing the operative consent form. No guarantees were given in regard to results of the surgery.       DESCRIPTION OF PROCEDURE: The patient was seen, evaluated, and cleared for surgery by anesthesia. Admitted in the preoperative holding area. The operative site was marked, consent was reviewed, history and physical was updated, and preoperative labs were reviewed. A  regional block was then placed per anesthesia. The patient was then taken to the operating room and placed in a supine position on a regular OR table. After successful intubation per anesthesia, nonsterile tourniquet was applied to operative extremity.  All bony prominence is well-padded and patient was secured to the table with a waist strap. The right lower extremity was then sterilely prepped and draped in a standard fashion.       A formal timeout was completed, including confirmation of History and Physical, operative consent, surgical site, patient identification number, and preoperative antibiotic administration. The right lower extremity was exsanguinated using an Esmarch and the tourniquet was inflated to 250 mmHG.  The procedure was then begun with an 10 cm incision over the lateral aspect of the right distal fibula centered over the fracture site with a #15 blade. Careful dissection was carried through the subcutaneous tissue with Metzenbaum scissors until the periosteal layer of the fibula was identified at this point in time.  Fracture site was identified at this point time and tentative reduction obtained with pointed reduction and lobster-claw clamps. A K wire was then placed in bicortical fashion perpendicular to the fracture site, securing initial fixation of the fracture in anatomic position. A 7-hole smith and nephew anatomic distal fibula locking plate was chosen and applied to the lateral aspect of the distal fibula, with position confirmed to be appropriate under C-arm fluoroscopy as well as acceptable reduction of the fracture. One cortical screw was placed in bicortical fashion proximal to the fracture site. The plate was clamped to the distal portion of the distal fibula, and a locking screw was placed distal to the fracture site. Additional locking screws were placed distal to the fracture site and cortical screws proximal to the fracture at this point in time.  Reduction and stability at  the fracture site were once again assessed under direct visualization as well as under stress with gentle range of motion of ankle.  Stress examination of syndesmosis under fluoroscopy was also completed this point in time which demonstrated widening of the syndesmosis.    We then made a small poke hole incision medially over the anterior medial distal tibia.  We bluntly dissected down to bone and placed the large periarticular clamp from posterior laterally on the fibula to anterior medially on the distal tibia.  The ankle was placed in neutral position and mortise radiographs were taken to confirm reduction of the ankle mortise.  We then used the Smith & Nephew tight rope eyelet K wire and passed it through the lateral hole in the plate to the anterior medial tibia and percutaneously through the skin.  A tight rope was then shuttled through the fibula and tibia and the button was turned parallel to the bone.  The tight rope was then sequentially tightened and secured in place.  The ends were trimmed and the shuttle stitch was removed.  The periarticular clamp was then removed.  Final fluoroscopic images were taken at this point in time, confirming acceptable reduction and placement of hardware as well as reduction of the ankle syndesmosis and medial clear space.  The tourniquet was then deflated and hemostasis was obtained and confirmed.  The wound was then copiously irrigated with normal saline.     Attention was then turned to closure of the wound with 2-0 monocryl for subcutaneous closure, 3-0 nylon horizontal mattresses for skin closure. The wounds were dressed with zeroform, 4 x 4 gauze, ABD pad, web roll 4 x 30 and 3 x 35 splint, and Ace wrap.        At the end of the procedure, all lap, needle, and sponge counts were correct x2. The patient had brisk capillary refill to all digits of the right lower extremity. Compartments were soft and easily compressible at the end of the procedure.       DISPOSITION: The  patient was awakened per anesthesia and taken to the recovery room in stable condition. Patient will be  discharged today.  Patient will be nonweightbearing on operative extremity until follow-up visit, dressing and cam boot to remain in place until follow-up visit. Will follow up in office in 2 weeks for wound check. Results discussed immediately after procedure with family and all questions were answered postoperatively.

## 2022-12-30 NOTE — ANESTHESIA PROCEDURE NOTES
Peripheral Block    Pre-sedation assessment completed: 12/30/2022 7:05 AM    Patient reassessed immediately prior to procedure    Patient location during procedure: pre-op  Start time: 12/30/2022 7:12 AM  Stop time: 12/30/2022 7:16 AM  Reason for block: at surgeon's request and post-op pain management  Performed by  CRNA/CAA: Goyo Ann CRNA  Preanesthetic Checklist  Completed: patient identified, IV checked, site marked, risks and benefits discussed, surgical consent, monitors and equipment checked, pre-op evaluation and timeout performed  Prep:  Pt Position: left lateral decubitus  Sterile barriers:cap, gloves, gown, mask and sterile barriers  Prep: ChloraPrep  Patient monitoring: blood pressure monitoring, continuous pulse oximetry and EKG  Procedure    Sedation: yes  Performed under: local infiltration  Guidance:ultrasound guided    ULTRASOUND INTERPRETATION.  Using ultrasound guidance a 21 G gauge needle was placed in close proximity to the nerve, at which point, under ultrasound guidance anesthetic was injected in the area of the nerve and spread of the anesthesia was seen on ultrasound in close proximity thereto.  There were no abnormalities seen on ultrasound; a digital image was taken; and the patient tolerated the procedure with no complications. Images:still images obtained, printed/placed on chart  Loss of twitch: 0.5 mA  Laterality:right  Block Type:popliteal    Needle Type:echogenic  Needle Gauge:21 G  Resistance on Injection: none    Medications Used: bupivacaine PF (MARCAINE) injection 0.5% - Injection   20 mL - 12/30/2022 7:12:00 AM      Medications  Preservative Free Saline:10ml    Post Assessment  Injection Assessment: negative aspiration for heme, no paresthesia on injection and incremental injection  Patient Tolerance:comfortable throughout block  Complications:no

## 2023-01-06 DIAGNOSIS — S82.61XA CLOSED DISPLACED FRACTURE OF LATERAL MALLEOLUS OF RIGHT FIBULA, INITIAL ENCOUNTER: ICD-10-CM

## 2023-01-06 RX ORDER — OXYCODONE HYDROCHLORIDE AND ACETAMINOPHEN 5; 325 MG/1; MG/1
1-2 TABLET ORAL EVERY 4 HOURS PRN
Qty: 45 TABLET | Refills: 0 | Status: SHIPPED | OUTPATIENT
Start: 2023-01-06 | End: 2023-01-31

## 2023-01-12 ENCOUNTER — OFFICE VISIT (OUTPATIENT)
Dept: ORTHOPEDIC SURGERY | Facility: CLINIC | Age: 43
End: 2023-01-12
Payer: COMMERCIAL

## 2023-01-12 VITALS — WEIGHT: 197 LBS | BODY MASS INDEX: 36.25 KG/M2 | HEIGHT: 62 IN

## 2023-01-12 DIAGNOSIS — S82.61XD CLOSED DISPLACED FRACTURE OF LATERAL MALLEOLUS OF RIGHT FIBULA WITH ROUTINE HEALING, SUBSEQUENT ENCOUNTER: Primary | ICD-10-CM

## 2023-01-12 PROCEDURE — 73610 X-RAY EXAM OF ANKLE: CPT | Performed by: INTERNAL MEDICINE

## 2023-01-12 PROCEDURE — 99024 POSTOP FOLLOW-UP VISIT: CPT | Performed by: INTERNAL MEDICINE

## 2023-01-12 RX ORDER — CYCLOBENZAPRINE HCL 5 MG
5 TABLET ORAL 3 TIMES DAILY PRN
Qty: 35 TABLET | Refills: 0 | Status: SHIPPED | OUTPATIENT
Start: 2023-01-12

## 2023-01-12 NOTE — PROGRESS NOTES
"Subjective:     Patient ID: Nazanin Telles is a 43 y.o. female.    Chief Complaint:    History of Present Illness  Nazanin Telles returns to clinic today for evaluation of right ankle 2 weeks status post ORIF.  The patient states she is doing well and the pain is improving but she still having some muscle spasms in bilateral legs.  She is hopeful to have the splint discontinued today.  She has a lot of questions about her future rehab and how things are likely to progress.     Social History     Occupational History   • Not on file   Tobacco Use   • Smoking status: Every Day     Packs/day: 1.00     Types: Cigarettes     Passive exposure: Never   • Smokeless tobacco: Never   Vaping Use   • Vaping Use: Never used   Substance and Sexual Activity   • Alcohol use: Defer   • Drug use: Never   • Sexual activity: Yes      Past Medical History:   Diagnosis Date   • Anxiety    • Disease of thyroid gland      Past Surgical History:   Procedure Laterality Date   • ANKLE OPEN REDUCTION INTERNAL FIXATION Right 12/30/2022    Procedure: ANKLE OPEN REDUCTION INTERNAL FIXATION, possible syndesmosis fixation, all associated procedures;  Surgeon: Ross Sol MD;  Location: [facility];  Service: Orthopedics;  Laterality: Right;   • THYROID SURGERY         History reviewed. No pertinent family history.              Objective:  Vitals:    01/12/23 1000   Weight: 89.4 kg (197 lb)   Height: 157.5 cm (62\")         01/12/23  1000   Weight: 89.4 kg (197 lb)     Body mass index is 36.03 kg/m².        Right Ankle Exam     Tenderness   Right ankle tenderness location: global.    Range of Motion   Dorsiflexion: abnormal   Plantar flexion: abnormal   Eversion: abnormal   Inversion: abnormal     Muscle Strength   Dorsiflexion:  4/5  Plantar flexion:  4/5  Anterior tibial:  4/5  Posterior tibial:  4/5  Gastrocsoleus:  4/5  Peroneal muscle:  4/5    Other   Erythema: absent  Scars: present  Sensation: normal  Pulse: present            "   Imaging: 3 views of the right ankle were ordered and reviewed myself in the office today  Indication: Right ankle status post ORIF  Findings: Trays demonstrate a right ankle status post ORIF with syndesmotic fixation.  Implants are in expected position and fracture alignment is maintained.  The radiographs today are suboptimal with the foot in a maximally plantarflexed position which gives an appearance of lateral talar tilt.  Comparative studies: Medial postoperative films    Assessment:      No diagnosis found.       Plan:          1. Discussed treatment options at length with patient at today's visit. ***  2. Follow up: ***      Nazanin Telles was in agreement with plan and had all questions answered.     Medications:  No orders of the defined types were placed in this encounter.      Followup:  No follow-ups on file.    There are no diagnoses linked to this encounter.      Dictated utilizing Dragon dictation   Answers for HPI/ROS submitted by the patient on 1/6/2023  Please describe your symptoms.: Surgery follow up  Have you had these symptoms before?: No  How long have you been having these symptoms?: 5-7 days  Please list any medications you are currently taking for this condition.: All the medications he prescribed for me in the hospital  What is the primary reason for your visit?: Other

## 2023-01-12 NOTE — PROGRESS NOTES
"Subjective:     Patient ID: Nazanin Telles is a 43 y.o. female.    Chief Complaint:    History of Present Illness  Nazanin Telles returns to clinic today for evaluation of right ankle 2 weeks status post ORIF.  The patient states she is doing well and the pain is improving but she still having some muscle spasms in bilateral legs.  She is hopeful to have the splint discontinued today.  She has a lot of questions about her future rehab and how things are likely to progress.     Social History     Occupational History   • Not on file   Tobacco Use   • Smoking status: Every Day     Packs/day: 1.00     Types: Cigarettes     Passive exposure: Never   • Smokeless tobacco: Never   Vaping Use   • Vaping Use: Never used   Substance and Sexual Activity   • Alcohol use: Defer   • Drug use: Never   • Sexual activity: Yes      Past Medical History:   Diagnosis Date   • Anxiety    • Disease of thyroid gland      Past Surgical History:   Procedure Laterality Date   • ANKLE OPEN REDUCTION INTERNAL FIXATION Right 12/30/2022    Procedure: ANKLE OPEN REDUCTION INTERNAL FIXATION, possible syndesmosis fixation, all associated procedures;  Surgeon: Ross Sol MD;  Location: [facility];  Service: Orthopedics;  Laterality: Right;   • THYROID SURGERY         History reviewed. No pertinent family history.              Objective:  Vitals:    01/12/23 1000   Weight: 89.4 kg (197 lb)   Height: 157.5 cm (62\")         01/12/23  1000   Weight: 89.4 kg (197 lb)     Body mass index is 36.03 kg/m².        Right Ankle Exam     Tenderness   Right ankle tenderness location: global.    Range of Motion   Dorsiflexion: abnormal   Plantar flexion: abnormal   Eversion: abnormal   Inversion: abnormal     Muscle Strength   Dorsiflexion:  4/5  Plantar flexion:  4/5  Anterior tibial:  4/5  Posterior tibial:  4/5  Gastrocsoleus:  4/5  Peroneal muscle:  4/5    Other   Erythema: absent  Scars: present  Sensation: normal  Pulse: present            "   Imaging: 3 views of the right ankle were ordered and reviewed myself in the office today  Indication: Right ankle status post ORIF  Findings: Trays demonstrate a right ankle status post ORIF with syndesmotic fixation.  Implants are in expected position and fracture alignment is maintained.  The radiographs today are suboptimal with the foot in a maximally plantarflexed position which gives an appearance of lateral talar tilt.  Comparative studies: Medial postoperative films    Assessment:        1. Closed displaced fracture of lateral malleolus of right fibula with routine healing, subsequent encounter           Plan:          1. Discussed treatment options at length with patient at today's visit.  At this point time we will transition the patient to a cam boot.  She may shower and wash her foot but I like her to remain nonweightbearing for roughly 3 more weeks.  I will see the patient back in 3 weeks and we will repeat x-rays of the foot in a neutral position.  She will then likely begin to transition to weightbearing as tolerated in the boot and will refer to physical therapy to work on ankle range of motion exercises.  2. Follow up: 3 weeks with three-view x-rays of her right ankle with the foot in a neutral position.  Please ask me for help with taking the radiographs.      Nazanin Telles was in agreement with plan and had all questions answered.     Medications:  New Medications Ordered This Visit   Medications   • cyclobenzaprine (FLEXERIL) 5 MG tablet     Sig: Take 1 tablet by mouth 3 (Three) Times a Day As Needed for Muscle Spasms.     Dispense:  35 tablet     Refill:  0       Followup:  No follow-ups on file.    Diagnoses and all orders for this visit:    1. Closed displaced fracture of lateral malleolus of right fibula with routine healing, subsequent encounter (Primary)  -     XR Ankle 3+ View Right    Other orders  -     cyclobenzaprine (FLEXERIL) 5 MG tablet; Take 1 tablet by mouth 3 (Three)  Times a Day As Needed for Muscle Spasms.  Dispense: 35 tablet; Refill: 0          Dictated utilizing Dragon dictation   Answers for HPI/ROS submitted by the patient on 1/6/2023  Please describe your symptoms.: Surgery follow up  Have you had these symptoms before?: No  How long have you been having these symptoms?: 5-7 days  Please list any medications you are currently taking for this condition.: All the medications he prescribed for me in the hospital  What is the primary reason for your visit?: Other

## 2023-01-31 ENCOUNTER — OFFICE VISIT (OUTPATIENT)
Dept: FAMILY MEDICINE CLINIC | Facility: CLINIC | Age: 43
End: 2023-01-31
Payer: COMMERCIAL

## 2023-01-31 VITALS
DIASTOLIC BLOOD PRESSURE: 82 MMHG | BODY MASS INDEX: 35.88 KG/M2 | SYSTOLIC BLOOD PRESSURE: 120 MMHG | TEMPERATURE: 98.6 F | WEIGHT: 195 LBS | HEART RATE: 105 BPM | HEIGHT: 62 IN | OXYGEN SATURATION: 98 %

## 2023-01-31 DIAGNOSIS — Z76.89 ENCOUNTER TO ESTABLISH CARE: Primary | ICD-10-CM

## 2023-01-31 DIAGNOSIS — Z01.419 ENCOUNTER FOR GYNECOLOGICAL EXAMINATION: ICD-10-CM

## 2023-01-31 DIAGNOSIS — Z86.32 HISTORY OF GESTATIONAL DIABETES: ICD-10-CM

## 2023-01-31 DIAGNOSIS — R63.5 WEIGHT GAIN: ICD-10-CM

## 2023-01-31 DIAGNOSIS — E05.00 GRAVES DISEASE: ICD-10-CM

## 2023-01-31 DIAGNOSIS — Z13.220 SCREENING FOR LIPID DISORDERS: ICD-10-CM

## 2023-01-31 DIAGNOSIS — E55.9 VITAMIN D DEFICIENCY: ICD-10-CM

## 2023-01-31 PROCEDURE — 99214 OFFICE O/P EST MOD 30 MIN: CPT | Performed by: NURSE PRACTITIONER

## 2023-01-31 RX ORDER — LEVOTHYROXINE SODIUM 112 UG/1
112 TABLET ORAL DAILY
Qty: 30 TABLET | Refills: 0 | Status: SHIPPED | OUTPATIENT
Start: 2023-01-31 | End: 2023-03-28 | Stop reason: SDUPTHER

## 2023-01-31 NOTE — PROGRESS NOTES
Patient ID: Nazanin Telles is a 43 y.o. female     Patient Care Team:  Head, RAMESH Kam as PCP - General (Nurse Practitioner)    Subjective     Chief Complaint   Patient presents with   • Establish Care   • Thyroid Problem       History of Present Illness    Nazanin Telles presents to [facility] today to establish care with our practice.  Previous PCP was Dr. Mendez with [facility].  Last office visit 3/24/2022.    Recent surgery on December 30, 2022 per Dr. Boyer for right ankle fracture.  Continues to wear boot and ambulate with crutches.  Fracture occurred after slipping on ice on [employer].  She is in need of continued medication management concerning her Graves' disease.  Last thyroid levels were checked on March 18, 2022 and was stable.  She is currently on levothyroxine 112 mcg daily.  She has also been told she has had low vitamin D in the past.  She takes an over-the-counter supplement daily.  She is also had prior history of anxiety.  Previous PCP had placed her on fluoxetine.  However she is unsure if helped or if it was because of some of her unintentional weight gain.  She has stopped taking medication for this and feels she is managing well.  Gained 70 pounds in 2 years.      Health Habits:  Dental Exam: Up to date  Eye Exam: Up to date  Diet:  Exercise: Limited due to use of crutches.    Current exercise activities include:  Pap: Past due - 6 years ago  Mammogram: Never.  Mother passed away from breast cancer.    Colonoscopy: Never   She denies any complaints of fever, chills, cough, chest pain, shortness of air, abdominal pain, nausea, or any other concerns.     The following portions of the patient's history were reviewed and updated as appropriate: allergies, current medications, past family history, past medical history, past social history, past surgical history and problem list.       ROS    Vitals:    01/31/23 1536   BP: 120/82   Pulse: 105    Temp: 98.6 °F (37 °C)   SpO2: 98%       Documented weights    01/31/23 1536   Weight: 88.5 kg (195 lb)     Body mass index is 35.66 kg/m².    Results for orders placed or performed during the hospital encounter of 12/30/22   hCG, Serum, Qualitative    Specimen: Blood   Result Value Ref Range    HCG Qualitative Negative Negative   ECG 12 Lead Pre-Op / Pre-Procedure   Result Value Ref Range    QT Interval 412 ms           Objective     Physical Exam  Vitals reviewed.   Constitutional:       General: She is not in acute distress.  HENT:      Head: Normocephalic and atraumatic.   Cardiovascular:      Rate and Rhythm: Normal rate and regular rhythm.      Heart sounds: No murmur heard.  Pulmonary:      Effort: Pulmonary effort is normal.      Breath sounds: Normal breath sounds. No wheezing.   Musculoskeletal:      Comments: Ambulating with crutches.  Boot to right lower leg.     Neurological:      Mental Status: She is alert and oriented to person, place, and time.   Psychiatric:         Mood and Affect: Mood normal.         Behavior: Behavior normal.            Assessment & Plan     Assessment/Plan     Diagnoses and all orders for this visit:    1. Encounter to establish care (Primary)    2. Graves disease  -     levothyroxine (SYNTHROID, LEVOTHROID) 112 MCG tablet; Take 1 tablet by mouth Daily.  Dispense: 30 tablet; Refill: 0  -     T3, Free; Future  -     T4, Free; Future  -     TSH; Future  -     Thyroid Peroxidase Antibody; Future    3. Vitamin D deficiency  -     Vitamin D,25-Hydroxy; Future    4. Screening for lipid disorders  -     Comprehensive Metabolic Panel; Future  -     Lipid Panel With / Chol / HDL Ratio; Future    5. History of gestational diabetes  -     Comprehensive Metabolic Panel; Future  -     Lipid Panel With / Chol / HDL Ratio; Future  -     Hemoglobin A1c; Future    6. Weight gain  -     TSH; Future  -     CBC (No Diff); Future  -     Hemoglobin A1c; Future    7. Encounter for gynecological  examination  -     Ambulatory Referral to Gynecology          Summary:  Nazanin Telles present office today to establish care with our practice.  Denies any new problems or concerns.  Needs refill on her thyroid medication.  Since her refill for levothyroxine 112 mcg daily.  Advised to schedule for fasting lab appointment in the near future.  I will notify her of results.  If all are stable, instructed return to office in 1 year for next annual physical with fasting labs.  Also strongly advised to have Pap and mammogram.  I placed referral to GYN.  Patient verbalized understanding.    In the meantime, instructed to contact us sooner for any problems or concerns.    Follow Up:  Return if symptoms worsen or fail to improve, for Fasting labs and will call.  .    Patient was given instructions and counseling regarding condition or for health maintenance advice.  Please see specific information pulled into the AVS if appropriate.      Patient was wearing facemask when I entered the room and throughout our encounter. Protective equipment was worn throughout this patient encounter including a face mask.  Hand hygiene was performed before donning protective equipment and after removal when leaving the room.     Yoly Martinez, APRN  Family Medicine  [facility]  01/31/23  17:17 EST

## 2023-02-01 ENCOUNTER — PATIENT ROUNDING (BHMG ONLY) (OUTPATIENT)
Dept: FAMILY MEDICINE CLINIC | Facility: CLINIC | Age: 43
End: 2023-02-01
Payer: COMMERCIAL

## 2023-02-02 ENCOUNTER — OFFICE VISIT (OUTPATIENT)
Dept: ORTHOPEDIC SURGERY | Facility: CLINIC | Age: 43
End: 2023-02-02
Payer: COMMERCIAL

## 2023-02-02 VITALS — WEIGHT: 195 LBS | BODY MASS INDEX: 35.88 KG/M2 | HEIGHT: 62 IN

## 2023-02-02 DIAGNOSIS — S82.61XD CLOSED DISPLACED FRACTURE OF LATERAL MALLEOLUS OF RIGHT FIBULA WITH ROUTINE HEALING, SUBSEQUENT ENCOUNTER: Primary | ICD-10-CM

## 2023-02-02 PROCEDURE — 99024 POSTOP FOLLOW-UP VISIT: CPT | Performed by: INTERNAL MEDICINE

## 2023-02-02 PROCEDURE — 73610 X-RAY EXAM OF ANKLE: CPT | Performed by: INTERNAL MEDICINE

## 2023-02-02 NOTE — PROGRESS NOTES
"Subjective:     Patient ID: Nazanin Telles is a 43 y.o. female.    Chief Complaint:    History of Present Illness  Nazanin Telles returns to clinic today for evaluation of right ankle status post ORIF right stable ankle fracture.  She is now 6 weeks out and is very happy with result.  She is wearing the cam boot and has been nonweightbearing.  He is been working on ankle exercises on her own at home.  She is here today for further evaluation management she denies significant pain swelling.     Social History     Occupational History   • Not on file   Tobacco Use   • Smoking status: Former     Packs/day: 1.00     Types: Cigarettes     Passive exposure: Never   • Smokeless tobacco: Never   Vaping Use   • Vaping Use: Every day   • Substances: Nicotine   Substance and Sexual Activity   • Alcohol use: Defer   • Drug use: Never   • Sexual activity: Yes      Past Medical History:   Diagnosis Date   • Anxiety    • Disease of thyroid gland      Past Surgical History:   Procedure Laterality Date   • ANKLE OPEN REDUCTION INTERNAL FIXATION Right 12/30/2022    Procedure: ANKLE OPEN REDUCTION INTERNAL FIXATION, possible syndesmosis fixation, all associated procedures;  Surgeon: Ross Sol MD;  Location: [facility];  Service: Orthopedics;  Laterality: Right;   • THYROID SURGERY         Family History   Problem Relation Age of Onset   • Breast cancer Mother                  Objective:  Vitals:    02/02/23 1335   Weight: 88.5 kg (195 lb)   Height: 157.5 cm (62.01\")         02/02/23  1335   Weight: 88.5 kg (195 lb)     Body mass index is 35.65 kg/m².        Ortho Exam       Imaging: 3 views the right ankle were ordered and reviewed by myself in the office today  Indication: Right ankle fracture  Findings: X-rays demonstrate a right ankle status post ORIF with implants in expected position and signs of fibular fracture healing.  Medial clear space and syndesmosis appear anatomically reduced with no signs of widening.  " No new fracture dislocation or subluxation  Comparative studies: X-rays on 1/12/2023    Assessment:      No diagnosis found.       Plan:          1. Discussed treatment options at length with patient at today's visit.  At this point time patient may be weightbearing as tolerated in the cam boot.  I will refer to physical therapy to begin working on range of motion strengthening and proprioceptive exercises.  I will see the patient back in 4 weeks and at that time we will hopefully discontinue the use of cam boot.  2. Follow up: 4 weeks with 3 views of the right ankle      Nazanin Telels was in agreement with plan and had all questions answered.     Medications:  No orders of the defined types were placed in this encounter.      Followup:  No follow-ups on file.    There are no diagnoses linked to this encounter.      Dictated utilizing Dragon dictation

## 2023-02-06 ENCOUNTER — HOSPITAL ENCOUNTER (OUTPATIENT)
Dept: PHYSICAL THERAPY | Facility: HOSPITAL | Age: 43
Setting detail: THERAPIES SERIES
Discharge: HOME | End: 2023-02-06
Payer: COMMERCIAL

## 2023-02-06 DIAGNOSIS — S82.61XD CLOSED DISPLACED FRACTURE OF LATERAL MALLEOLUS OF RIGHT FIBULA WITH ROUTINE HEALING, SUBSEQUENT ENCOUNTER: Primary | ICD-10-CM

## 2023-02-06 PROCEDURE — 97161 PT EVAL LOW COMPLEX 20 MIN: CPT | Performed by: PHYSICAL THERAPIST

## 2023-02-06 NOTE — THERAPY EVALUATION
Outpatient Physical Therapy Ortho Initial Evaluation   [place]     Patient Name: Nazanin Telles  : 1980  MRN: [id number]  Today's Date: 2023      Visit Date: 2023    Patient Active Problem List   Diagnosis   • Closed displaced fracture of lateral malleolus of right fibula   • Ankle syndesmosis disruption, right, initial encounter        Past Medical History:   Diagnosis Date   • Anxiety    • Disease of thyroid gland         Past Surgical History:   Procedure Laterality Date   • ANKLE OPEN REDUCTION INTERNAL FIXATION Right 2022    Procedure: ANKLE OPEN REDUCTION INTERNAL FIXATION, possible syndesmosis fixation, all associated procedures;  Surgeon: Ross Sol MD;  Location: [facility];  Service: Orthopedics;  Laterality: Right;   • THYROID SURGERY         Visit Dx:     ICD-10-CM ICD-9-CM   1. Closed displaced fracture of lateral malleolus of right fibula with routine healing, subsequent encounter  S82.61XD V54.19          Patient History     Row Name 23 0930             History    Chief Complaint Difficulty Walking;Difficulty with daily activities;Pain  -      Type of Pain Ankle pain  right  -      Date Current Problem(s) Began 22  -      Brief Description of Current Complaint Pt states she injured her right ankle  when she slipped on ice. She did not seek any medical attention for approximately one week when the pain persisted. She was seen at an [facility] and x-rays showed a fibula fracture. She was placed in a fracture boot and referred to ortho. She was seen by Dr. Sol who did an ORIF on 2022. She was initially placed in a posterior splint for several weeks and then back in the fracture boot. She has been NWBing using a knee scooter for the past 6 weeks. She can now go WBAT on her right LE.  -      Patient/Caregiver Goals Relieve pain;Return to prior level of function;Improve mobility;Improve strength;Return to work  -      Patient's Rating  of General Health Good  -GC      Hand Dominance right-handed  -GC      Occupation/sports/leisure activities   -      What clinical tests have you had for this problem? X-ray  -      Results of Clinical Tests fibula fracture  -GC         Pain     Pain Location Ankle  right  -GC      Pain at Present 2  -GC      Pain at Best 2  -GC      Pain at Worst 7  -GC      Pain Frequency Constant/continuous  -GC      Pain Description Aching;Discomfort;Sore;Tender  -GC      What Performance Factors Make the Current Problem(s) WORSE? Pt c/o pain with being on her feet  -GC      What Performance Factors Make the Current Problem(s) BETTER? Pt feels best if she gets off her feet and rests  -GC      Difficulties at work? 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 Pt hasdifficulty being on her feet for ADLs  -         Daily Activities    Primary Language English  -GC      Are you able to read Yes  -GC      Are you able to write Yes  -GC      How does patient learn best? Listening  -      Teaching needs identified Home Exercise Program;Management of Condition  -GC      Patient is concerned about/has problems with Climbing Stairs;Flexibility;Performing home management (household chores, shopping, care of dependents);Performing job responsibilities/community activities (work, school,;Standing;Walking  -GC      Does patient have problems with the following? Anxiety  -GC      Barriers to learning None  -GC      Functional Status mobility issues preventing performance of daily activities  -GC      Pt Participated in POC and Goals Yes  -GC         Safety    Are you being hurt, hit, or frightened by anyone at home or in your life? No  -GC      Are you being neglected by a caregiver No  -GC            User Key  (r) = Recorded By, (t) = Taken By, (c) = Cosigned By    Initials Name Provider Type    GC David Khan, PT Physical Therapist                  PT Ortho     Row Name 02/06/23 0930       Posture/Observations    Posture/Observations Comments Pt baldemar seen with fracture boot right ankle. The surgical site is nicely healed. She does have minimal to moderate edema right ankle.  -GC       Foot/Ankle Palpation    Fibula Right:;Tender  -GC    Lateral Malleolus Right:;Tender  -GC    ATFL Right:;Tender  -GC    Posterior Tibialis Right:;Tender  -GC    Peroneals Right:;Tender  -GC       Ankle/Foot Special Tests    Anterior drawer (ATFL lesion) Right:;Negative  -GC    Drummond test (Achilles’ tendon rupture) Right:;Negative  -GC    Dacia’s sign (DVT) Right:;Negative  -GC    Tib/Fib Compression Right:;Negative  -GC       Right Lower Ext    Rt Ankle Dorsiflexion AROM -6 degrees of neutral  -GC    Rt Ankle Plantarflexion AROM 55 degrees  -GC    Rt Ankle Inversion AROM 38 degrees  -GC    Rt Ankle Eversion AROM 10 degrees  -GC       MMT Right Lower Ext    Rt Ankle Plantarflexion MMT, Gross Movement (4/5) good  -GC    Rt Ankle Dorsiflexion MMT, Gross Movement (4+/5) good plus  -GC    Rt Ankle Subtalar Inversion MT, Gross Movement (4/5) good  -GC    Rt Ankle Subtalar Eversion MMT, Gross Movement (4/5) good  -GC       Sensation    Light Touch No apparent deficits  -GC       Lower Extremity Flexibility    Gastrocnemius Right:;Moderately limited  -GC    Soleus Right:;Moderately limited  -GC       Transfers    Comment, (Transfers) Pt is independent with all bed mobilityand transfers  -GC       Gait/Stairs (Locomotion)    Comment, (Gait/Stairs) Pt ambualates with significant antalgic gait right LE  -GC          User Key  (r) = Recorded By, (t) = Taken By, (c) = Cosigned By    Initials Name Provider Type    GC David Khan PT Physical Therapist                            Therapy Education  Given: HEP, Symptoms/condition management, Pain management  Program: New  How Provided: Verbal, Demonstration, Written  Provided to: Patient  Level of Understanding: Teach  back education performed, Verbalized, Demonstrated      PT OP Goals     Row Name 02/06/23 0930          PT Short Term Goals    STG Date to Achieve 02/20/23  -     STG 1 Decrease right ankle pain to 3-4/10 with activity.  -GC     STG 2 Decrease right ankle edema to WFL with testing.  -GC     STG 3 Increase right ankle DF ROM to 10 degrees with testing.  -GC     STG 4 Increase right ankle strength to at least 4+/5 all planes with testing.  -     STG 5 Pt will be independent with her HEP issued by this therapist.  -        Long Term Goals    LTG Date to Achieve 03/06/23  -GC     LTG 1 Decrease right ankle pain to 0-1/10 with activity.  -     LTG 2 Increase right ankle AROM to WFL all planes with testing.  -GC     LTG 3 Increase right ankle strength to 5/5 all planes with testing.  -     LTG 4 Pt will ambulate normally on levels and stairs.  -     LTG 5 Pt will be independent with all ADLs and have a LEFS score > 70.  -        Time Calculation    PT Goal Re-Cert Due Date 03/06/23  -           User Key  (r) = Recorded By, (t) = Taken By, (c) = Cosigned By    Initials Name Provider Type     David Khan, PT Physical Therapist                 PT Assessment/Plan     Row Name 02/06/23 0930          PT Assessment    Functional Limitations Impaired gait;Limitation in home management;Limitations in community activities;Limitations in functional capacity and performance;Performance in leisure activities;Performance in self-care ADL;Performance in work activities  -     Impairments Edema;Gait;Impaired flexibility;Pain;Muscle strength;Range of motion  -     Assessment Comments Pt presents approximately 6 weeks s/p ORIF of right fibula fracture. She is now WBAT on her right LE. She has right ankle pain rated up to 7/10 wiht activity. She has minimal to moderate edema right ankle, decreased right ankle strength, decreased right ankle ROM, decreased ambulatory status, and decreased function secondary to the  above.  -     Rehab Potential Good  -GC     Patient/caregiver participated in establishment of treatment plan and goals Yes  -     Patient would benefit from skilled therapy intervention Yes  -GC        PT Plan    PT Frequency 1x/week;2x/week  -     Predicted Duration of Therapy Intervention (PT) 4 weeks  -     Planned CPT's? PT EVAL LOW COMPLEXITY: 03861;PT THER PROC EA 15 MIN: 28508  -     PT Plan Comments Pt is to continue her HEP 2x daily  -           User Key  (r) = Recorded By, (t) = Taken By, (c) = Cosigned By    Initials Name Provider Type     David Khan, PT Physical Therapist                   OP Exercises     Row Name 02/06/23 0930             Exercise 1    Exercise Name 1 NWBing gastoc and soleus stretches  -      Cueing 1 Verbal;Tactile  -GC      Reps 1 15  -GC      Time 1 10 secs  -GC         Exercise 2    Exercise Name 2 Towel curls with toes  -      Cueing 2 Verbal;Tactile;Demo  -GC      Time 2 5 min  -GC         Exercise 3    Exercise Name 3 Pro Stretch in sitting  -      Cueing 3 Verbal;Tactile;Demo  -GC      Reps 3 25  -GC         Exercise 4    Exercise Name 4 Rock Boarad in sitting  -GC      Cueing 4 Verbal;Tactile;Demo  -GC      Reps 4 20xCW/CCW  -GC         Exercise 5    Exercise Name 5 4-way ankle vs theraband  -      Cueing 5 Verbal;Tactile  -GC      Reps 5 25  -GC      Time 5 blue  -            User Key  (r) = Recorded By, (t) = Taken By, (c) = Cosigned By    Initials Name Provider Type     David Khan, PT Physical Therapist                              Outcome Measure Options: Lower Extremity Functional Scale (LEFS)  Lower Extremity Functional Index  Any of your usual work, housework or school activities: Moderate difficulty  Your usual hobbies, recreational or sporting activities: Moderate difficulty  Getting into or out of the bath: Moderate difficulty  Walking between rooms: A little bit of difficulty  Putting on your shoes or socks: No  difficulty  Squatting: A little bit of difficulty  Lifting an object, like a bag of groceries from the floor: No difficulty  Performing light activities around your home: No difficulty  Performing heavy activities around your home: A little bit of difficulty  Getting into or out of a car: No difficulty  Walking 2 blocks: Extreme difficulty or unable to perform activity  Walking a mile: Extreme difficulty or unable to perform activity  Going up or down 10 stairs (about 1 flight of stairs): Moderate difficulty  Standing for 1 hour: Moderate difficulty  Sitting for 1 hour: No difficulty  Running on even ground: Extreme difficulty or unable to perform activity  Running on uneven ground: Extreme difficulty or unable to perform activity  Making sharp turns while running fast: Extreme difficulty or unable to perform activity  Hopping: Extreme difficulty or unable to perform activity  Rolling over in bed: No difficulty  Total: 43      Time Calculation:     Start Time: 0930  Stop Time: 1026  Time Calculation (min): 56 min     Therapy Charges for Today     Code Description Service Date Service Provider Modifiers Qty    98921845606 HC PT EVAL LOW COMPLEXITY 3 2/6/2023 David Khan, PT GP 1          PT G-Codes  Outcome Measure Options: Lower Extremity Functional Scale (LEFS)  Total: 43         David Khan PT  2/6/2023

## 2023-02-13 ENCOUNTER — HOSPITAL ENCOUNTER (OUTPATIENT)
Dept: PHYSICAL THERAPY | Facility: HOSPITAL | Age: 43
Setting detail: THERAPIES SERIES
Discharge: HOME | End: 2023-02-13
Payer: COMMERCIAL

## 2023-02-13 DIAGNOSIS — S82.61XD CLOSED DISPLACED FRACTURE OF LATERAL MALLEOLUS OF RIGHT FIBULA WITH ROUTINE HEALING, SUBSEQUENT ENCOUNTER: Primary | ICD-10-CM

## 2023-02-13 PROCEDURE — 97110 THERAPEUTIC EXERCISES: CPT | Performed by: PHYSICAL THERAPIST

## 2023-02-13 NOTE — THERAPY TREATMENT NOTE
Outpatient Physical Therapy Ortho Treatment Note   [facility] [place]     Patient Name: Nazanin Telles  : 1980  MRN: [id number]  Today's Date: 2023      Visit Date: 2023    Visit Dx:    ICD-10-CM ICD-9-CM   1. Closed displaced fracture of lateral malleolus of right fibula with routine healing, subsequent encounter  S82.61XD V54.19       Patient Active Problem List   Diagnosis   • Closed displaced fracture of lateral malleolus of right fibula   • Ankle syndesmosis disruption, right, initial encounter        Past Medical History:   Diagnosis Date   • Anxiety    • Disease of thyroid gland         Past Surgical History:   Procedure Laterality Date   • ANKLE OPEN REDUCTION INTERNAL FIXATION Right 2022    Procedure: ANKLE OPEN REDUCTION INTERNAL FIXATION, possible syndesmosis fixation, all associated procedures;  Surgeon: Ross Sol MD;  Location: [facility];  Service: Orthopedics;  Laterality: Right;   • THYROID SURGERY          PT Ortho     Row Name 23 1000       Right Lower Ext    Rt Ankle Dorsiflexion AROM 2 degrees  -GC          User Key  (r) = Recorded By, (t) = Taken By, (c) = Cosigned By    Initials Name Provider Type    David Noriega, PT Physical Therapist                             PT Assessment/Plan     Row Name 23 0900          PT Assessment    Assessment Comments Pt is doing well with good toelrance to the ankle mobilizations. She does show increased ankle DF ROM as well.  -GC        PT Plan    PT Plan Comments Pt is to continue her HEP daily. Will re-ck again next week.  -GC           User Key  (r) = Recorded By, (t) = Taken By, (c) = Cosigned By    Initials Name Provider Type    David Noriega, PT Physical Therapist                 Modalities     Row Name 23 0900             Moist Heat    MH Applied Yes  -GC      Location right ankle  -GC      PT Moist Heat Minutes 10  -GC      MH Prior to Rx Yes  -GC            User Key  (r) = Recorded By, (t) =  Taken By, (c) = Cosigned By    Initials Name Provider Type     David Khan, PT Physical Therapist               OP Exercises     Row Name 02/13/23 0900             Exercise 1    Exercise Name 1 NWBing gastoc and soleus stretches  -GC      Cueing 1 Verbal;Tactile  -GC      Reps 1 15  -GC      Time 1 10 secs  -GC         Exercise 2    Exercise Name 2 Towel curls with toes  -GC      Cueing 2 Verbal;Tactile;Demo  -GC      Time 2 5 min  -GC         Exercise 3    Exercise Name 3 Pro Stretch in sitting  -GC      Cueing 3 Verbal;Tactile;Demo  -GC      Reps 3 25  -GC         Exercise 4    Exercise Name 4 Rock Boarad in sitting  -GC      Cueing 4 Verbal;Tactile;Demo  -GC      Reps 4 20xCW/CCW  -GC         Exercise 5    Exercise Name 5 4-way ankle vs theraband  -GC      Cueing 5 Verbal;Tactile  -GC      Reps 5 25  -GC      Time 5 blue  -GC         Exercise 6    Exercise Name 6 Ankle mobilizations-grade I/II  -GC      Cueing 6 Verbal;Tactile  -GC      Time 6 10 min  -GC            User Key  (r) = Recorded By, (t) = Taken By, (c) = Cosigned By    Initials Name Provider Type     David Khan, PT Physical Therapist                                                Time Calculation:   Start Time: 0900  Stop Time: 0941  Time Calculation (min): 41 min  Untimed Charges  PT Moist Heat Minutes: 10  Total Minutes  Untimed Charges Total Minutes: 10   Total Minutes: 10  Therapy Charges for Today     Code Description Service Date Service Provider Modifiers Qty    89829038939 HC PT THER PROC EA 15 MIN 2/13/2023 David Khan, PT GP 2                    David Khan PT  2/13/2023

## 2023-02-14 ENCOUNTER — OFFICE VISIT (OUTPATIENT)
Dept: OBSTETRICS AND GYNECOLOGY | Facility: CLINIC | Age: 43
End: 2023-02-14
Payer: COMMERCIAL

## 2023-02-14 VITALS
BODY MASS INDEX: 34.78 KG/M2 | SYSTOLIC BLOOD PRESSURE: 164 MMHG | WEIGHT: 189 LBS | HEIGHT: 62 IN | DIASTOLIC BLOOD PRESSURE: 102 MMHG

## 2023-02-14 DIAGNOSIS — Z01.419 WELL WOMAN EXAM: ICD-10-CM

## 2023-02-14 DIAGNOSIS — Z11.51 ENCOUNTER FOR SCREENING FOR HUMAN PAPILLOMAVIRUS (HPV): ICD-10-CM

## 2023-02-14 DIAGNOSIS — Z01.419 PAP SMEAR, LOW-RISK: ICD-10-CM

## 2023-02-14 DIAGNOSIS — Z01.419 ROUTINE GYNECOLOGICAL EXAMINATION: Primary | ICD-10-CM

## 2023-02-14 DIAGNOSIS — I10 ESSENTIAL HYPERTENSION: ICD-10-CM

## 2023-02-14 LAB
BILIRUB BLD-MCNC: NEGATIVE MG/DL
CLARITY, POC: CLEAR
COLOR UR: YELLOW
GLUCOSE UR STRIP-MCNC: NEGATIVE MG/DL
KETONES UR QL: NEGATIVE
LEUKOCYTE EST, POC: NEGATIVE
NITRITE UR-MCNC: NEGATIVE MG/ML
PH UR: 5 [PH] (ref 5–8)
PROT UR STRIP-MCNC: NEGATIVE MG/DL
RBC # UR STRIP: NEGATIVE /UL
SP GR UR: 1 (ref 1–1.03)
UROBILINOGEN UR QL: NORMAL

## 2023-02-14 PROCEDURE — 81002 URINALYSIS NONAUTO W/O SCOPE: CPT | Performed by: STUDENT IN AN ORGANIZED HEALTH CARE EDUCATION/TRAINING PROGRAM

## 2023-02-14 PROCEDURE — 99386 PREV VISIT NEW AGE 40-64: CPT | Performed by: STUDENT IN AN ORGANIZED HEALTH CARE EDUCATION/TRAINING PROGRAM

## 2023-02-14 PROCEDURE — 99213 OFFICE O/P EST LOW 20 MIN: CPT | Performed by: STUDENT IN AN ORGANIZED HEALTH CARE EDUCATION/TRAINING PROGRAM

## 2023-02-14 RX ORDER — NIFEDIPINE 30 MG/1
30 TABLET, FILM COATED, EXTENDED RELEASE ORAL DAILY
Qty: 30 TABLET | Refills: 1 | Status: SHIPPED | OUTPATIENT
Start: 2023-02-14

## 2023-02-14 NOTE — PROGRESS NOTES
GYN Annual Exam     CC- Here for annual exam.     Nazanin Telles is a 43 y.o. female new patient who presents for annual well woman exam. Periods are regular every 28-30 days, lasting several days. Has noticed an increase in her B/P at her last couple of Dr Appointments. Recent R ank;e fracture with surgical Tx.     OB History    No obstetric history on file.         Menarche: 15yo, q month, several days of flow   Current contraception: abstinence and coitus interruptus  History of abnormal Pap smear: no  History of abnormal mammogram: no  Family history of uterine, colon or ovarian cancer: no  Family history of breast cancer: yes - Breast Cancer Mother ( May have been ling) 53yo   H/o STDs: Denies  Last pap: Unsure   Gardasil:missed  CHRISTA: none    Health Maintenance   Topic Date Due   • Annual Gynecologic Pelvic and Breast Exam  Never done   • COVID-19 Vaccine (3 - Booster for Moderna series) 05/13/2021   • INFLUENZA VACCINE  Never done   • HEPATITIS C SCREENING  Never done   • ANNUAL PHYSICAL  Never done   • PAP SMEAR  Never done   • TDAP/TD VACCINES (2 - Td or Tdap) 11/14/2026   • Pneumococcal Vaccine 0-64  Aged Out       Past Medical History:   Diagnosis Date   • Anxiety    • Disease of thyroid gland        Past Surgical History:   Procedure Laterality Date   • ANKLE OPEN REDUCTION INTERNAL FIXATION Right 12/30/2022    Procedure: ANKLE OPEN REDUCTION INTERNAL FIXATION, possible syndesmosis fixation, all associated procedures;  Surgeon: Ross Sol MD;  Location: [facility];  Service: Orthopedics;  Laterality: Right;   • THYROID SURGERY           Current Outpatient Medications:   •  cyclobenzaprine (FLEXERIL) 5 MG tablet, Take 1 tablet by mouth 3 (Three) Times a Day As Needed for Muscle Spasms., Disp: 35 tablet, Rfl: 0  •  levothyroxine (SYNTHROID, LEVOTHROID) 112 MCG tablet, Take 1 tablet by mouth Daily., Disp: 30 tablet, Rfl: 0  •  MAGNESIUM CHLORIDE PO, Take  by mouth., Disp: , Rfl:   •  melatonin 5  "MG tablet tablet, Take 20 mg by mouth Every Night., Disp: , Rfl:   •  VITAMIN D, ERGOCALCIFEROL, PO, Take  by mouth., Disp: , Rfl:   •  NIFEdipine CC (Adalat CC) 30 MG 24 hr tablet, Take 1 tablet by mouth Daily., Disp: 30 tablet, Rfl: 1    No Known Allergies    Social History     Tobacco Use   • Smoking status: Former     Packs/day: 1.00     Types: Cigarettes     Passive exposure: Never   • Smokeless tobacco: Never   Vaping Use   • Vaping Use: Every day   • Substances: Nicotine   Substance Use Topics   • Alcohol use: Defer   • Drug use: Never       Family History   Problem Relation Age of Onset   • Breast cancer Mother        Review of Systems  Low libido  Anxiety    BP (!) 164/102   Ht 157.5 cm (62\")   Wt 85.7 kg (189 lb)   LMP 02/01/2023 (Approximate)   BMI 34.57 kg/m²     Physical Exam  Exam conducted with a chaperone present.   Constitutional:       Appearance: Normal appearance.   Cardiovascular:      Rate and Rhythm: Normal rate and regular rhythm.      Pulses: Normal pulses.      Heart sounds: Normal heart sounds.   Pulmonary:      Effort: Pulmonary effort is normal.      Breath sounds: Normal breath sounds.   Abdominal:      General: Abdomen is flat.      Palpations: Abdomen is soft.   Genitourinary:     General: Normal vulva.      Vagina: Normal.      Cervix: Normal.      Uterus: Normal.    Skin:     General: Skin is warm and dry.   Neurological:      Mental Status: She is oriented to person, place, and time.   Psychiatric:         Behavior: Behavior normal.               Assessment/Plan    1) GYN HM: pap/HPV  SBE demonstrated and encouraged.  2) STD screening: declines Condoms encouraged.  3) Contraception: abstinence and coitus interruptus  4) Family Planning: family planning: no plans at present, encourage folic acid daily  5) Diet and Exercise discussed  6) Smoking Status: No  7) Social:   8) MMG-  due, will schedule  9)Follow up 1-2 weeks        Diagnoses and all orders for this visit:    1. " Routine gynecological examination (Primary)  -     IGP, Apt HPV,rfx 16 / 18,45  -     POC Urinalysis Dipstick    2. Well woman exam  -     Mammo Diagnostic Digital Tomosynthesis Bilateral With CAD; Future    3. Pap smear, low-risk  -     IGP, Apt HPV,rfx 16 / 18,45    4. Encounter for screening for human papillomavirus (HPV)  -     IGP, Apt HPV,rfx 16 / 18,45    Other orders  -     NIFEdipine CC (Adalat CC) 30 MG 24 hr tablet; Take 1 tablet by mouth Daily.  Dispense: 30 tablet; Refill: 1      I spent 10 minutes on the separately reported service of HTN ( Rx) Pt to see PCM as well. This time is not included in the time used to support the E/M service also reported today.    Josh Hightower DO  02/14/2023    12:03 EST

## 2023-02-16 ENCOUNTER — PATIENT ROUNDING (BHMG ONLY) (OUTPATIENT)
Dept: OBSTETRICS AND GYNECOLOGY | Facility: CLINIC | Age: 43
End: 2023-02-16
Payer: COMMERCIAL

## 2023-02-16 NOTE — PROGRESS NOTES
A MY-CHART MESSAGE HAS BEEN SENT TO THE PATIENT FOR PATIENT ROUNDING WITH [facility]

## 2023-02-20 ENCOUNTER — APPOINTMENT (OUTPATIENT)
Dept: PHYSICAL THERAPY | Facility: HOSPITAL | Age: 43
End: 2023-02-20
Payer: COMMERCIAL

## 2023-02-20 LAB
CYTOLOGIST CVX/VAG CYTO: NORMAL
CYTOLOGY CVX/VAG DOC CYTO: NORMAL
CYTOLOGY CVX/VAG DOC THIN PREP: NORMAL
DX ICD CODE: NORMAL
HIV 1 & 2 AB SER-IMP: NORMAL
HPV I/H RISK 4 DNA CVX QL PROBE+SIG AMP: NEGATIVE
OTHER STN SPEC: NORMAL
STAT OF ADQ CVX/VAG CYTO-IMP: NORMAL

## 2023-03-02 ENCOUNTER — OFFICE VISIT (OUTPATIENT)
Dept: ORTHOPEDIC SURGERY | Facility: CLINIC | Age: 43
End: 2023-03-02
Payer: COMMERCIAL

## 2023-03-02 VITALS — BODY MASS INDEX: 34.78 KG/M2 | WEIGHT: 189 LBS | HEIGHT: 62 IN

## 2023-03-02 DIAGNOSIS — S82.61XD CLOSED DISPLACED FRACTURE OF LATERAL MALLEOLUS OF RIGHT FIBULA WITH ROUTINE HEALING, SUBSEQUENT ENCOUNTER: Primary | ICD-10-CM

## 2023-03-02 PROCEDURE — 73610 X-RAY EXAM OF ANKLE: CPT | Performed by: INTERNAL MEDICINE

## 2023-03-02 PROCEDURE — 99024 POSTOP FOLLOW-UP VISIT: CPT | Performed by: INTERNAL MEDICINE

## 2023-03-02 NOTE — PROGRESS NOTES
"Subjective:     Patient ID: Nazanin Telles is a 43 y.o. female.    Chief Complaint:    History of Present Illness  Nazanin Telles presents to clinic today for evaluation of right ankle status post ORIF on 12/30/2022.  The patient states that she has been doing so well in physical therapy that she discontinue the use of the boot on her own.  She is able to walk with a mild limp.  She states she still has a little bit of stiffness but overall is doing quite well.  She is back in a normal shoe.  She states that there is still some swelling particular at the end of a long day but overall she is very happy with the result.     Social History     Occupational History   • Not on file   Tobacco Use   • Smoking status: Former     Packs/day: 1.00     Types: Cigarettes     Passive exposure: Never   • Smokeless tobacco: Never   Vaping Use   • Vaping Use: Every day   • Substances: Nicotine   Substance and Sexual Activity   • Alcohol use: Defer   • Drug use: Never   • Sexual activity: Yes      Past Medical History:   Diagnosis Date   • Anxiety    • Disease of thyroid gland      Past Surgical History:   Procedure Laterality Date   • ANKLE OPEN REDUCTION INTERNAL FIXATION Right 12/30/2022    Procedure: ANKLE OPEN REDUCTION INTERNAL FIXATION, possible syndesmosis fixation, all associated procedures;  Surgeon: Ross Sol MD;  Location: [facility];  Service: Orthopedics;  Laterality: Right;   • THYROID SURGERY         Family History   Problem Relation Age of Onset   • Breast cancer Mother                  Objective:  Vitals:    03/02/23 1321   Weight: 85.7 kg (189 lb)   Height: 157.5 cm (62\")         03/02/23  1321   Weight: 85.7 kg (189 lb)     Body mass index is 34.57 kg/m².        Right Ankle Exam     Tenderness   The patient is experiencing no tenderness.  Swelling: mild    Range of Motion   Dorsiflexion: abnormal   Plantar flexion: normal   Eversion: normal   Inversion: normal     Muscle Strength   Dorsiflexion:  " 4/5  Plantar flexion:  5/5  Anterior tibial:  5/5  Posterior tibial:  5/5  Gastrocsoleus:  5/5  Peroneal muscle:  5/5    Tests   Anterior drawer: negative  Varus tilt: negative    Other   Erythema: absent  Scars: present  Sensation: normal  Pulse: present     Comments:  Incisions healed with no signs of infection               Imaging: Views of the right ankle were ordered and reviewed by myself in the today  Indication: Right ankle surgery  Findings: X-rays demonstrate a fibular fracture status post ORIF with syndesmotic fixation.  Fracture appears healed medial clear space and syndesmotic space appear concentrically reduced with no signs of residual widening.  No new acute fracture dislocation or subluxation mild lateral soft tissue swelling  Comparative studies: X-rays on 2/2/2023    Assessment:        1. Closed displaced fracture of lateral malleolus of right fibula with routine healing, subsequent encounter           Plan:          1. Discussed treatment options at length with patient at today's visit.  At this point time the patient has made a near full recovery.  She states that she is happy with result and is able to walk well and drive in a normal shoe.  I discussed with her that I would recommend getting a compressive ankle sleeve that she will wear during the day and especially at night to help decrease some of the residual swelling that she has.  I discussed that swelling is normal and can persist for 6 months to a year.  I discussed with the patient that if she is doing well at this point time she does not need to schedule further follow-up.  I am happy to see her back at any point time if issues arise.  2. Follow up: As needed      Nazanin Telles was in agreement with plan and had all questions answered.     Medications:  No orders of the defined types were placed in this encounter.      Followup:  No follow-ups on file.    Diagnoses and all orders for this visit:    1. Closed displaced fracture  of lateral malleolus of right fibula with routine healing, subsequent encounter (Primary)  -     XR Ankle 3+ View Right          Dictated utilizing Dragon dictation

## 2023-03-28 DIAGNOSIS — E05.00 GRAVES DISEASE: ICD-10-CM

## 2023-03-28 RX ORDER — LEVOTHYROXINE SODIUM 112 UG/1
112 TABLET ORAL DAILY
Qty: 30 TABLET | Refills: 0 | Status: SHIPPED | OUTPATIENT
Start: 2023-03-28

## 2023-04-14 ENCOUNTER — OFFICE VISIT (OUTPATIENT)
Dept: FAMILY MEDICINE CLINIC | Facility: CLINIC | Age: 43
End: 2023-04-14
Payer: COMMERCIAL

## 2023-04-14 VITALS
OXYGEN SATURATION: 98 % | HEART RATE: 136 BPM | BODY MASS INDEX: 33.13 KG/M2 | HEIGHT: 62 IN | WEIGHT: 180 LBS | DIASTOLIC BLOOD PRESSURE: 108 MMHG | SYSTOLIC BLOOD PRESSURE: 148 MMHG | TEMPERATURE: 98.6 F

## 2023-04-14 DIAGNOSIS — I10 UNCONTROLLED HYPERTENSION: ICD-10-CM

## 2023-04-14 DIAGNOSIS — J02.9 SORE THROAT: Primary | ICD-10-CM

## 2023-04-14 DIAGNOSIS — F41.9 ANXIETY: ICD-10-CM

## 2023-04-14 DIAGNOSIS — H10.13 ALLERGIC CONJUNCTIVITIS OF BOTH EYES: ICD-10-CM

## 2023-04-14 LAB
EXPIRATION DATE: NORMAL
INTERNAL CONTROL: NORMAL
Lab: NORMAL
S PYO AG THROAT QL: NEGATIVE

## 2023-04-14 PROCEDURE — 99213 OFFICE O/P EST LOW 20 MIN: CPT | Performed by: NURSE PRACTITIONER

## 2023-04-14 PROCEDURE — 87880 STREP A ASSAY W/OPTIC: CPT | Performed by: NURSE PRACTITIONER

## 2023-04-14 RX ORDER — OLOPATADINE HYDROCHLORIDE 2 MG/ML
1 SOLUTION/ DROPS OPHTHALMIC DAILY
Qty: 2.5 ML | Refills: 0 | Status: SHIPPED | OUTPATIENT
Start: 2023-04-14

## 2023-04-14 RX ORDER — PROPRANOLOL HYDROCHLORIDE 20 MG/1
20 TABLET ORAL 2 TIMES DAILY
Qty: 60 TABLET | Refills: 0 | Status: SHIPPED | OUTPATIENT
Start: 2023-04-14

## 2023-04-14 NOTE — PROGRESS NOTES
"Chief Complaint   Patient presents with   • Sore Throat     Mornings, since Tuesday- nephew has strep was together sunday     • Eye Drainage     Both eyes had drainage- taking allergy medications , started Monday    • Hypertension     Started nifedipine from obgyn        Upper Respiratory Infection: Patient complains of symptoms of a URI. Symptoms include congestion, cough and sore throat. Onset of symptoms was 3 days ago, gradually improving since that time. She also c/o eye redness for the past 3 days .  She is drinking moderate amounts of fluids. Evaluation to date: none. Treatment to date: none.  Ill contacts at home or school or work discussed.  Nephew tested + for strep.      B/P was elevated at GYN on 2/14/2023. 164/102. Started on nifedipine.  Has not noticed improvement however does not have B/P cuff at home.  No symptoms with elevated B/P however does suffer from anxiety.  Has been told she had elevated B/ readings in the past.  After relaxing in exam room, b/P would usually improve.      Suffers from anxiety.  Was taking fluoxetine however stopped due to weight gain.  Has also taken propranolol, amitriptyline.  Felt propranolol helped in the past however was worried would lower heart rate too much so she stopped taking.      The following portions of the patient's history were reviewed and updated as appropriate: allergies, current medications, past family history, past medical history, past social history, past surgical history and problem list.        Vitals:    04/14/23 1005   BP: (!) 148/108   BP Location: Left arm   Patient Position: Sitting   Cuff Size: Adult   Pulse: (!) 136   Temp: 98.6 °F (37 °C)   SpO2: 98%   Weight: 81.6 kg (180 lb)   Height: 157.5 cm (62\")     Gen: Well appearing, alert, anxious  Eyes:  Slight redness.    Ears: TM's normal without redness  Nose:  Congestion  Throat:  Pink without exudate, some drainage  Neck: No LAD  Lung: Good air movement, regular RR  Heart: Tachycardia " without murmur.  B/P 148/108  Skin: No rash      Lab Results   Component Value Date    RAPSCRN Negative 04/14/2023         Assessment & Plan   Diagnoses and all orders for this visit:    1. Sore throat (Primary)  -     POCT rapid strep A    2. Allergic conjunctivitis of both eyes  -     olopatadine (PATADAY) 0.2 % solution ophthalmic solution; Administer 1 drop to both eyes Daily.  Dispense: 2.5 mL; Refill: 0    3. Anxiety  -     propranolol (INDERAL) 20 MG tablet; Take 1 tablet by mouth 2 (Two) Times a Day.  Dispense: 60 tablet; Refill: 0  -     Ambulatory Referral to Psychiatry    4. Uncontrolled hypertension  -     propranolol (INDERAL) 20 MG tablet; Take 1 tablet by mouth 2 (Two) Times a Day.  Dispense: 60 tablet; Refill: 0   Return in 2 weeks for recheck on B/P with MA.  She will also have fasting labs completed same day.           Tylenol or Advil as needed for pain, fever, muscle aches  Plenty of fluids  Hand washing discussed  Off work or school note given if needed.  Warm tea for throat.  Pros and cons of antibiotic use discussed.  Instructed to notify us if symptoms worsen or do not improve.      Yoly Martinez, APRN  Family Practice  [facility] [place]

## 2023-04-18 ENCOUNTER — OFFICE VISIT (OUTPATIENT)
Dept: OBSTETRICS AND GYNECOLOGY | Facility: CLINIC | Age: 43
End: 2023-04-18
Payer: COMMERCIAL

## 2023-04-18 VITALS — WEIGHT: 182.8 LBS | HEIGHT: 62 IN | BODY MASS INDEX: 33.64 KG/M2

## 2023-04-18 DIAGNOSIS — N94.10 FEMALE DYSPAREUNIA: ICD-10-CM

## 2023-04-18 DIAGNOSIS — R68.82 LOW LIBIDO: ICD-10-CM

## 2023-04-18 DIAGNOSIS — I10 ESSENTIAL HYPERTENSION: ICD-10-CM

## 2023-04-18 DIAGNOSIS — Z13.89 SCREENING FOR GENITOURINARY CONDITION: Primary | ICD-10-CM

## 2023-04-18 LAB
B-HCG UR QL: NEGATIVE
BILIRUB BLD-MCNC: NEGATIVE MG/DL
CLARITY, POC: CLEAR
COLOR UR: YELLOW
EXPIRATION DATE: NORMAL
GLUCOSE UR STRIP-MCNC: NEGATIVE MG/DL
INTERNAL NEGATIVE CONTROL: NORMAL
INTERNAL POSITIVE CONTROL: NORMAL
KETONES UR QL: NEGATIVE
LEUKOCYTE EST, POC: NEGATIVE
Lab: NORMAL
NITRITE UR-MCNC: NEGATIVE MG/ML
PH UR: 5 [PH] (ref 5–8)
PROT UR STRIP-MCNC: NEGATIVE MG/DL
RBC # UR STRIP: NEGATIVE /UL
SP GR UR: 1 (ref 1–1.03)
UROBILINOGEN UR QL: NORMAL

## 2023-04-18 NOTE — PROGRESS NOTES
Here today to discuss dyspareunia and low libido. Also noted to have HTN on annual. Her CPM is managing HTN and declines B/P assessment today. As it pertains to dyspareunia doesn't want Tx options at this time       Last appt:   Nazanin Telles is a 43 y.o. female new patient who presents for annual well woman exam. Periods are regular every 28-30 days, lasting several days. Has noticed an increase in her B/P at her last couple of Dr Appointments. Recent R ank;e fracture with surgical Tx.     OB History    No obstetric history on file.         Menarche: 13yo, q month, several days of flow   Current contraception: abstinence and coitus interruptus  History of abnormal Pap smear: no  History of abnormal mammogram: no  Family history of uterine, colon or ovarian cancer: no  Family history of breast cancer: yes - Breast Cancer Mother ( May have been ling) 51yo   H/o STDs: Denies  Last pap: Unsure   Gardasil:missed  CHRISTA: none    Health Maintenance   Topic Date Due   • Annual Gynecologic Pelvic and Breast Exam  Never done   • COVID-19 Vaccine (3 - Booster for Moderna series) 05/13/2021   • HEPATITIS C SCREENING  Never done   • ANNUAL PHYSICAL  Never done   • INFLUENZA VACCINE  08/01/2023   • PAP SMEAR  02/14/2026   • TDAP/TD VACCINES (2 - Td or Tdap) 11/14/2026   • Pneumococcal Vaccine 0-64  Aged Out       Past Medical History:   Diagnosis Date   • Anxiety    • Disease of thyroid gland        Past Surgical History:   Procedure Laterality Date   • ANKLE OPEN REDUCTION INTERNAL FIXATION Right 12/30/2022    Procedure: ANKLE OPEN REDUCTION INTERNAL FIXATION, possible syndesmosis fixation, all associated procedures;  Surgeon: Ross Sol MD;  Location: [facility];  Service: Orthopedics;  Laterality: Right;   • THYROID SURGERY           Current Outpatient Medications:   •  cyclobenzaprine (FLEXERIL) 5 MG tablet, Take 1 tablet by mouth 3 (Three) Times a Day As Needed for Muscle Spasms., Disp: 35 tablet, Rfl: 0  •   "levothyroxine (SYNTHROID, LEVOTHROID) 112 MCG tablet, Take 1 tablet by mouth Daily., Disp: 30 tablet, Rfl: 0  •  MAGNESIUM CHLORIDE PO, Take  by mouth., Disp: , Rfl:   •  melatonin 5 MG tablet tablet, Take 4 tablets by mouth Every Night., Disp: , Rfl:   •  olopatadine (PATADAY) 0.2 % solution ophthalmic solution, Administer 1 drop to both eyes Daily., Disp: 2.5 mL, Rfl: 0  •  propranolol (INDERAL) 20 MG tablet, Take 1 tablet by mouth 2 (Two) Times a Day., Disp: 60 tablet, Rfl: 0  •  VITAMIN D, ERGOCALCIFEROL, PO, Take  by mouth., Disp: , Rfl:     No Known Allergies    Social History     Tobacco Use   • Smoking status: Former     Packs/day: 1.00     Years: 10.00     Pack years: 10.00     Types: Cigarettes     Passive exposure: Never   • Smokeless tobacco: Never   Vaping Use   • Vaping Use: Every day   • Substances: Nicotine   Substance Use Topics   • Alcohol use: Defer   • Drug use: Never       Family History   Problem Relation Age of Onset   • Breast cancer Mother        Review of Systems  Low libido  Anxiety    Ht 157.5 cm (62\")   Wt 82.9 kg (182 lb 12.8 oz)   LMP 03/29/2023 (Exact Date)   BMI 33.43 kg/m²        Vitals: Refused B/P assessment     General Appearance:  Awake. Alert. Well developed. Well nourished. In no acute distress.      Neurological:  ° Oriented to time, place, and person.  Skin:  ° General appearance was normal. No bruising or ecchymosis.       Assessment/Plan  HTN Managed by PCM     Dyspareunia and low libido not truly bothersome. Discussed PFPT, lubrication, position of comfort; could consider sexual counseling. Declines at this time. Does feel loved and supported by spouse.     Mammo ordered     Pap NILM HPV neg Feb 22       Josh Hightower DO  04/18/2023    14:26 EDT    "

## 2023-04-28 DIAGNOSIS — E05.00 GRAVES DISEASE: ICD-10-CM

## 2023-04-28 RX ORDER — LEVOTHYROXINE SODIUM 112 UG/1
112 TABLET ORAL DAILY
Qty: 90 TABLET | Refills: 0 | Status: SHIPPED | OUTPATIENT
Start: 2023-04-28

## 2023-05-10 ENCOUNTER — HOSPITAL ENCOUNTER (OUTPATIENT)
Dept: MAMMOGRAPHY | Facility: HOSPITAL | Age: 43
Discharge: HOME | End: 2023-05-10
Admitting: STUDENT IN AN ORGANIZED HEALTH CARE EDUCATION/TRAINING PROGRAM
Payer: COMMERCIAL

## 2023-05-10 DIAGNOSIS — Z13.89 SCREENING FOR GENITOURINARY CONDITION: ICD-10-CM

## 2023-05-10 PROCEDURE — 77063 BREAST TOMOSYNTHESIS BI: CPT

## 2023-05-10 PROCEDURE — 77067 SCR MAMMO BI INCL CAD: CPT

## 2023-05-17 DIAGNOSIS — F41.9 ANXIETY: ICD-10-CM

## 2023-05-17 DIAGNOSIS — I10 UNCONTROLLED HYPERTENSION: ICD-10-CM

## 2023-05-18 RX ORDER — PROPRANOLOL HYDROCHLORIDE 20 MG/1
20 TABLET ORAL 2 TIMES DAILY
Qty: 60 TABLET | Refills: 5 | Status: SHIPPED | OUTPATIENT
Start: 2023-05-18

## 2023-10-29 DIAGNOSIS — E05.00 GRAVES DISEASE: ICD-10-CM

## 2023-10-30 RX ORDER — LEVOTHYROXINE SODIUM 112 UG/1
112 TABLET ORAL DAILY
Qty: 90 TABLET | Refills: 0 | Status: SHIPPED | OUTPATIENT
Start: 2023-10-30

## 2023-11-24 DIAGNOSIS — F41.9 ANXIETY: ICD-10-CM

## 2023-11-24 DIAGNOSIS — I10 UNCONTROLLED HYPERTENSION: ICD-10-CM

## 2023-11-27 RX ORDER — PROPRANOLOL HYDROCHLORIDE 20 MG/1
20 TABLET ORAL 2 TIMES DAILY
Qty: 60 TABLET | Refills: 0 | Status: SHIPPED | OUTPATIENT
Start: 2023-11-27

## 2024-01-11 ENCOUNTER — TELEPHONE (OUTPATIENT)
Dept: FAMILY MEDICINE CLINIC | Facility: CLINIC | Age: 44
End: 2024-01-11

## 2024-01-11 DIAGNOSIS — I10 UNCONTROLLED HYPERTENSION: ICD-10-CM

## 2024-01-11 DIAGNOSIS — I10 ESSENTIAL HYPERTENSION: Primary | ICD-10-CM

## 2024-01-11 DIAGNOSIS — F41.9 ANXIETY: ICD-10-CM

## 2024-01-11 DIAGNOSIS — Z00.00 ANNUAL PHYSICAL EXAM: ICD-10-CM

## 2024-01-11 RX ORDER — PROPRANOLOL HYDROCHLORIDE 20 MG/1
20 TABLET ORAL 2 TIMES DAILY
Qty: 60 TABLET | Refills: 0 | Status: SHIPPED | OUTPATIENT
Start: 2024-01-11

## 2024-01-11 NOTE — TELEPHONE ENCOUNTER
Caller: Nazanin Telles    Relationship: Self    Best call back number: [phone]    What orders are you requesting (i.e. lab or imaging): LAB    In what timeframe would the patient need to come in: FOR PHYSICAL SCHEDULED 1-26-24       Additional notes: PLEASE CALL TO SCHEDULE LAB

## 2024-01-11 NOTE — TELEPHONE ENCOUNTER
Caller: Nazanin Telles    Relationship: Self    Best call back number: [phone]    Requested Prescriptions:   Requested Prescriptions     Pending Prescriptions Disp Refills    propranolol (INDERAL) 20 MG tablet 60 tablet 0     Sig: Take 1 tablet by mouth 2 (Two) Times a Day. Appointment required before further refills        Pharmacy where request should be sent: [facility] [id number] [facility] [address] [address] - [phone] [facility] [phone]      Last office visit with prescribing clinician: 4/14/2023   Last telemedicine visit with prescribing clinician: Visit date not found   Next office visit with prescribing clinician: 1/26/2024     Additional details provided by patient: OUT OF MEDICATION     Does the patient have less than a 3 day supply:  [x] Yes  [] No      Princess Noonan Rep   01/11/24 14:22 EST

## 2024-01-23 DIAGNOSIS — E05.00 GRAVES DISEASE: ICD-10-CM

## 2024-01-23 RX ORDER — LEVOTHYROXINE SODIUM 112 UG/1
112 TABLET ORAL DAILY
Qty: 90 TABLET | Refills: 0 | Status: SHIPPED | OUTPATIENT
Start: 2024-01-23 | End: 2024-01-26 | Stop reason: SDUPTHER

## 2024-01-26 ENCOUNTER — OFFICE VISIT (OUTPATIENT)
Dept: FAMILY MEDICINE CLINIC | Facility: CLINIC | Age: 44
End: 2024-01-26
Payer: COMMERCIAL

## 2024-01-26 VITALS
BODY MASS INDEX: 34.04 KG/M2 | TEMPERATURE: 97.8 F | WEIGHT: 185 LBS | SYSTOLIC BLOOD PRESSURE: 158 MMHG | HEIGHT: 62 IN | OXYGEN SATURATION: 100 % | DIASTOLIC BLOOD PRESSURE: 100 MMHG | HEART RATE: 97 BPM

## 2024-01-26 DIAGNOSIS — I10 UNCONTROLLED HYPERTENSION: ICD-10-CM

## 2024-01-26 DIAGNOSIS — E55.9 VITAMIN D DEFICIENCY: ICD-10-CM

## 2024-01-26 DIAGNOSIS — E05.00 GRAVES DISEASE: ICD-10-CM

## 2024-01-26 DIAGNOSIS — Z11.59 ENCOUNTER FOR HEPATITIS C SCREENING TEST FOR LOW RISK PATIENT: ICD-10-CM

## 2024-01-26 DIAGNOSIS — F41.9 ANXIETY: ICD-10-CM

## 2024-01-26 DIAGNOSIS — Z12.11 COLON CANCER SCREENING: ICD-10-CM

## 2024-01-26 DIAGNOSIS — Z00.00 ANNUAL PHYSICAL EXAM: Primary | ICD-10-CM

## 2024-01-26 DIAGNOSIS — R71.8 ELEVATED MCV: ICD-10-CM

## 2024-01-26 RX ORDER — PROPRANOLOL HYDROCHLORIDE 20 MG/1
20 TABLET ORAL 3 TIMES DAILY
Qty: 270 TABLET | Refills: 3 | Status: SHIPPED | OUTPATIENT
Start: 2024-01-26

## 2024-01-26 RX ORDER — LEVOTHYROXINE SODIUM 112 UG/1
112 TABLET ORAL DAILY
Qty: 90 TABLET | Refills: 3 | Status: SHIPPED | OUTPATIENT
Start: 2024-01-26

## 2024-01-26 NOTE — PROGRESS NOTES
"Chief Complaint   Patient presents with    Annual Exam       Patient Care Team:  Head, RAMESH Kam as PCP - General (Nurse Practitioner)    Subjective   Nazanin Telles is a 44 y.o. female and is here for a yearly physical exam. The patient reports problems - anxiety .  She feels propranolol is helping however wears off too soon.    Hypothyroidism:  Managed with levothyroxine 112 mcg daily.    Interested in weight loss medications such as Wegovy.      Do you take any herbs or supplements that were not prescribed by a doctor? no. If so, these will be added to active medication list.    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     The following portions of the patient's history were reviewed and updated as appropriate: allergies, current medications, past family history, past medical history, past social history, past surgical history, and problem list.    Social and Family and Surgical History reviewed and updated today, see Rooming tab.    Health History, Preventive Measures and Vaccination flow sheets reviewed and updated today.    Patient's current medical chart in Epic; including previous office notes, imaging, labs, specialist's evaluation either in notes or in Media tab reviewed today.    Other pertinent medical information also reviewed thru Care Everywhere function is also reviewed today.    Review of Systems  Review of Systems  Vitals:    01/26/24 1549   BP: 158/100   BP Location: Left arm   Patient Position: Sitting   Cuff Size: Adult   Pulse: 97   Temp: 97.8 °F (36.6 °C)   SpO2: 100%   Weight: 83.9 kg (185 lb)   Height: 157.5 cm (62\")     Wt Readings from Last 3 Encounters:   01/26/24 " 83.9 kg (185 lb)   06/28/23 79.4 kg (175 lb)   04/18/23 82.9 kg (182 lb 12.8 oz)       General Appearance:  Alert, cooperative, no distress, appears stated age   Head:  Normocephalic, without obvious abnormality, atraumatic   Eyes:  PERRL, conjunctiva/corneas clear, EOM's intact.   Ears:  Normal TM's and external ear canals, both ears   Nose: Nares normal, septum midline, mucosa normal, no drainage or sinus tenderness   Throat: Lips, mucosa, and tongue normal; teeth and gums normal   Neck: Supple, symmetrical, trachea midline, no adenopathy;   thyroid: No enlargement/tenderness/nodules       Lungs:  Clear to auscultation bilaterally, respirations even and unlabored   Chest wall:  No tenderness or deformity   Heart:  Regular rate and rhythm, S1 and S2 normal, no murmur, rub or gallop. B/P 138/90 recheck.     Abdomen:  Soft, non-tender, bowel sounds active all four quadrants,   no masses, no organomegaly           Extremities: Extremities normal, atraumatic, no cyanosis or edema   Pulses: 2+ and symmetric all extremities   Skin: Skin color, texture, turgor normal, no rashes or lesions   Lymph nodes: Cervical and supraclavicular nodes normal   Neurologic: CNII-XII intact. Normal strength.              Patient's (Body mass index is 33.84 kg/m².) indicates that they are obese (BMI >30) with health related conditions that include dyslipidemias . Weight is unchanged. BMI is is above average; BMI management plan is completed. We discussed portion control, increasing exercise, and Information on healthy weight added to patient's after visit summary.       Results for orders placed or performed in visit on 01/15/24   CBC (No Diff)    Specimen: Blood   Result Value Ref Range    WBC 7.15 3.40 - 10.80 10*3/mm3    RBC 3.91 3.77 - 5.28 10*6/mm3    Hemoglobin 13.4 12.0 - 15.9 g/dL    Hematocrit 39.0 34.0 - 46.6 %    MCV 99.7 (H) 79.0 - 97.0 fL    MCH 34.3 (H) 26.6 - 33.0 pg    MCHC 34.4 31.5 - 35.7 g/dL    RDW 11.7 (L) 12.3 - 15.4  %    Platelets 291 140 - 450 10*3/mm3   Comprehensive Metabolic Panel    Specimen: Blood   Result Value Ref Range    Glucose 100 (H) 65 - 99 mg/dL    BUN 10 6 - 20 mg/dL    Creatinine 0.79 0.57 - 1.00 mg/dL    EGFR Result 94.1 >60.0 mL/min/1.73    BUN/Creatinine Ratio 12.7 7.0 - 25.0    Sodium 139 136 - 145 mmol/L    Potassium 4.4 3.5 - 5.2 mmol/L    Chloride 105 98 - 107 mmol/L    Total CO2 27.0 22.0 - 29.0 mmol/L    Calcium 9.4 8.6 - 10.5 mg/dL    Total Protein 6.4 6.0 - 8.5 g/dL    Albumin 4.5 3.5 - 5.2 g/dL    Globulin 1.9 gm/dL    A/G Ratio 2.4 g/dL    Total Bilirubin 0.3 0.0 - 1.2 mg/dL    Alkaline Phosphatase 68 39 - 117 U/L    AST (SGOT) 26 1 - 32 U/L    ALT (SGPT) 36 (H) 1 - 33 U/L   Lipid Panel With / Chol / HDL Ratio    Specimen: Blood   Result Value Ref Range    Total Cholesterol 206 (H) 0 - 200 mg/dL    Triglycerides 54 0 - 150 mg/dL    HDL Cholesterol 77 (H) 40 - 60 mg/dL    VLDL Cholesterol Suhas 10 5 - 40 mg/dL    LDL Chol Calc (NIH) 119 (H) 0 - 100 mg/dL    Chol/HDL Ratio 2.68    TSH Rfx On Abnormal To Free T4    Specimen: Blood   Result Value Ref Range    TSH 1.750 0.270 - 4.200 uIU/mL   UA / M With / Rflx Culture([facility] ONLY) - Urine, Clean Catch    Specimen: Urine, Clean Catch   Result Value Ref Range    Specific Gravity, UA 1.024 1.005 - 1.030    pH, UA 5.5 5.0 - 7.5    Color, UA Yellow Yellow    Appearance, UA Cloudy (A) Clear    Leukocytes, UA Negative Negative    Protein Negative Negative/Trace    Glucose, UA Negative Negative    Ketones Negative Negative    Blood, UA 2+ (A) Negative    Bilirubin, UA Negative Negative    Urobilinogen, UA 0.2 0.2 - 1.0 mg/dL    Nitrite, UA Negative Negative    Microscopic Examination See below:     Urinalysis Reflex Comment    Microscopic Examination -   Result Value Ref Range    WBC, UA 0-5 0 - 5 /hpf    RBC, UA 3-10 (A) 0 - 2 /hpf    Epithelial Cells (non renal) >10 (A) 0 - 10 /hpf    Casts None seen None seen /lpf    Bacteria, UA Few None seen/Few /hpf    Vitamin B12   Result Value Ref Range    Vitamin B-12 715 211 - 946 pg/mL   Written Authorization   Result Value Ref Range    Written Authorization Comment      Assessment & Plan   Healthy female exam.  Diagnoses and all orders for this visit:    1. Annual physical exam (Primary)  -     CBC (No Diff); Future  -     Comprehensive Metabolic Panel; Future  -     Lipid Panel With / Chol / HDL Ratio; Future  -     TSH Rfx On Abnormal To Free T4; Future  -     Vitamin B12 & Folate; Future    2. Colon cancer screening  -     Cancel: Cologuard - Stool, Per Rectum; Future  -     Ambulatory Referral For Screening Colonoscopy    3. Anxiety  -     propranolol (INDERAL) 20 MG tablet; Take 1 tablet by mouth 3 (Three) Times a Day.  Dispense: 270 tablet; Refill: 3  -     Ambulatory Referral to Psychiatry  -     CBC (No Diff); Future  -     Comprehensive Metabolic Panel; Future    4. Uncontrolled hypertension  -     propranolol (INDERAL) 20 MG tablet; Take 1 tablet by mouth 3 (Three) Times a Day.  Dispense: 270 tablet; Refill: 3  -     CBC (No Diff); Future  -     Comprehensive Metabolic Panel; Future    5. Elevated MCV  -     CBC (No Diff); Future  -     Vitamin B12 & Folate; Future    6. Vitamin D deficiency  -     Vitamin D,25-Hydroxy; Future    7. Encounter for hepatitis C screening test for low risk patient  -     HCV Antibody Rfx To Qnt PCR; Future    8. Graves disease  -     levothyroxine (SYNTHROID, LEVOTHROID) 112 MCG tablet; Take 1 tablet by mouth Daily.  Dispense: 90 tablet; Refill: 3      1. Nazanin Telles has been doing well since last seen except continued anxiety.  Will try increasing propranolol to three times per day to see if helps.  Also request referral to psychiatry.  Reviewed recent labs along with patient which all remain stable except for continued elevated cholesterol levels however HDL is good at 77.  Will continue to manage with watching diet and incorporating exercise into daily routine.  B/P  initially elevated however improved with recheck.  Monitor at home and let us know if stays greater than 140/90.  Advised to check with insurance on coverage of GLP-1 medications.      2. Patient Counseling:  --Nutrition: Stressed importance of moderation in sodium/caffeine intake, saturated fat and cholesterol.  Discussed caloric balance, sufficient intake of fresh fruits, vegetables, fiber,    calcium, iron.  --Exercise: Stressed the importance of regular exercise.   --Substance Abuse: Discussed cessation/primary prevention of tobacco, alcohol, or other drug use; driving or other dangerous activities under the influence.    --Dental health: Discussed importance of regular tooth brushing, flossing, and dental visits.  --Suggested having eyes and vision checked if needed or past due.  --Immunizations reviewed.  --Discussed benefits of screening colonoscopy.  3. Discussed the patient's BMI with her.  The BMI is above average; BMI management plan is completed  4. Follow up next physical in 1 year    Patient was given instructions and counseling regarding condition or for health maintenance advice.  Please see specific information pulled into the AVS if appropriate.      Medications Discontinued During This Encounter   Medication Reason    olopatadine (PATADAY) 0.2 % solution ophthalmic solution *Therapy completed    predniSONE (DELTASONE) 10 MG tablet *Therapy completed    VITAMIN D, ERGOCALCIFEROL, PO *Therapy completed    propranolol (INDERAL) 20 MG tablet Reorder    levothyroxine (SYNTHROID, LEVOTHROID) 112 MCG tablet Reorder          Yoly Mratinez, APRN  Family Practice  [facility] [place]

## 2024-03-12 ENCOUNTER — TELEMEDICINE (OUTPATIENT)
Dept: PSYCHIATRY | Facility: CLINIC | Age: 44
End: 2024-03-12
Payer: COMMERCIAL

## 2024-03-12 DIAGNOSIS — F41.1 GAD (GENERALIZED ANXIETY DISORDER): Primary | ICD-10-CM

## 2024-03-12 PROCEDURE — 90792 PSYCH DIAG EVAL W/MED SRVCS: CPT | Performed by: NURSE PRACTITIONER

## 2024-03-12 RX ORDER — DESVENLAFAXINE SUCCINATE 50 MG/1
50 TABLET, EXTENDED RELEASE ORAL DAILY
Qty: 30 TABLET | Refills: 1 | Status: SHIPPED | OUTPATIENT
Start: 2024-03-12

## 2024-03-12 RX ORDER — ALPRAZOLAM 0.5 MG/1
0.5 TABLET ORAL DAILY PRN
Qty: 30 TABLET | Refills: 0 | Status: SHIPPED | OUTPATIENT
Start: 2024-03-12 | End: 2025-03-12

## 2024-03-12 NOTE — PROGRESS NOTES
Patient Name: Nazanin Telles  MRN: [id number]   :  1980     Referring Physician: Head, RAMESH Kam    This provider is located in her home office through the [facility] (through [facility]), [address] [facility] [facility], [address] using a secure MyChart Video Visit through Drop Mess[employer]. Patient is being seen remotely via telehealth at their home address in [place], and stated they are in a secure environment for this session. The patient's condition being diagnosed/treated is appropriate for telemedicine. The provider identified herself as well as her credentials.   The patient, and/or patients guardian, consent to be seen remotely, and when consent is given they understand that the consent allows for patient identifiable information to be sent to a third party as needed.   They may refuse to be seen remotely at any time. The electronic data is encrypted and password protected, and the patient and/or guardian has been advised of the potential risks to privacy not withstanding such measures.    You have chosen to receive care through a telehealth visit.  Do you consent to use a video/audio connection for your medical care today? Yes    Chief Complaint:     ICD-10-CM ICD-9-CM   1. GETACHEW (generalized anxiety disorder)  F41.1 300.02       HPI:   Nazanin Telles is a 44 y.o. female who is here today for initial evaluation of Anxiety .  Patient states her anxiety started years ago before she was diagnosed with Graves' disease.  States many cardiac issues were ruled out and her primary care provider at the time realized she had Graves' disease.  Patient's thyroid was removed and patient is on hormone replacement.  Patient states she is on propranolol for blood pressure and anxiety as well however for anxiety does not feel the propranolol is effective.  Patient states day-to-day her anxiety rates at a 6 on a scale of 0-10.  Does not feel necessarily depressed.  Does not have  significant ups or downs in mood.  Feels overall even keeled.  Patient has many physical signs of anxiety.  All cardiac issues have been ruled out.  Patient states sleep is stable as she takes melatonin.  Patient states maybe every few weeks she wakes up in the middle of the night but overall sleep is good.  Patient has tried antidepressants before 1 of which was Prozac.  Cannot say that they were effective.  Patient gained weight.  Patient states she has never been anxiety free.  States she has tried deep breathing, yoga, and magnesium.  More recently has tried ashwaganda.  States 1 capsule relaxed her significantly.  States it was recommended to take 2 capsules and when she took the second her anxiety got worse.  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  Does have Xanax on the occasions that she flies.    Past Medical History:   Past Medical History:   Diagnosis Date    Anxiety     Disease of thyroid gland        Past Surgical History:   Past Surgical History:   Procedure Laterality Date    ANKLE OPEN REDUCTION INTERNAL FIXATION Right 12/30/2022    Procedure: ANKLE OPEN REDUCTION INTERNAL FIXATION, possible syndesmosis fixation, all associated procedures;  Surgeon: Ross Sol MD;  Location: [facility];  Service: Orthopedics;  Laterality: Right;    THYROID SURGERY         Social History:   Social History     Socioeconomic History    Marital status:    Tobacco Use    Smoking status: Some Days     Current packs/day: 0.25     Average packs/day: 0.3 packs/day for 10.0 years (2.5 ttl pk-yrs)     Types: Cigarettes     Passive exposure: Never    Smokeless tobacco: Never   Vaping Use    Vaping status: Every Day    Substances: Nicotine   Substance and Sexual Activity    Alcohol use: Defer    Drug use: Never    Sexual activity: Yes       Family History:  Family History   Problem Relation Age of Onset    Breast cancer Mother        Allergy:  No Known Allergies    Current  Medications:   Current Outpatient Medications   Medication Sig Dispense Refill    ALPRAZolam (Xanax) 0.5 MG tablet Take 1 tablet by mouth Daily As Needed for Anxiety. 30 tablet 0    cyclobenzaprine (FLEXERIL) 5 MG tablet Take 1 tablet by mouth 3 (Three) Times a Day As Needed for Muscle Spasms. 35 tablet 0    desvenlafaxine (Pristiq) 50 MG 24 hr tablet Take 1 tablet by mouth Daily. 30 tablet 1    famotidine (Pepcid) 20 MG tablet Take 1 tablet by mouth 2 (Two) Times a Day. 60 tablet 0    levothyroxine (SYNTHROID, LEVOTHROID) 112 MCG tablet Take 1 tablet by mouth Daily. 90 tablet 3    MAGNESIUM CHLORIDE PO Take  by mouth.      melatonin 5 MG tablet tablet Take 4 tablets by mouth Every Night.      propranolol (INDERAL) 20 MG tablet Take 1 tablet by mouth 3 (Three) Times a Day. 270 tablet 3     No current facility-administered medications for this visit.       Lab Results:   Results Encounter on 01/15/2024   Component Date Value Ref Range Status    WBC 01/19/2024 7.15  3.40 - 10.80 10*3/mm3 Final    RBC 01/19/2024 3.91  3.77 - 5.28 10*6/mm3 Final    Hemoglobin 01/19/2024 13.4  12.0 - 15.9 g/dL Final    Hematocrit 01/19/2024 39.0  34.0 - 46.6 % Final    MCV 01/19/2024 99.7 (H)  79.0 - 97.0 fL Final    MCH 01/19/2024 34.3 (H)  26.6 - 33.0 pg Final    MCHC 01/19/2024 34.4  31.5 - 35.7 g/dL Final    RDW 01/19/2024 11.7 (L)  12.3 - 15.4 % Final    Platelets 01/19/2024 291  140 - 450 10*3/mm3 Final    Glucose 01/19/2024 100 (H)  65 - 99 mg/dL Final    BUN 01/19/2024 10  6 - 20 mg/dL Final    Creatinine 01/19/2024 0.79  0.57 - 1.00 mg/dL Final    EGFR Result 01/19/2024 94.1  >60.0 mL/min/1.73 Final    Comment: GFR Normal >60  Chronic Kidney Disease <60  Kidney Failure <15      BUN/Creatinine Ratio 01/19/2024 12.7  7.0 - 25.0 Final    Sodium 01/19/2024 139  136 - 145 mmol/L Final    Potassium 01/19/2024 4.4  3.5 - 5.2 mmol/L Final    Chloride 01/19/2024 105  98 - 107 mmol/L Final    Total CO2 01/19/2024 27.0  22.0 - 29.0 mmol/L  Final    Calcium 01/19/2024 9.4  8.6 - 10.5 mg/dL Final    Total Protein 01/19/2024 6.4  6.0 - 8.5 g/dL Final    Albumin 01/19/2024 4.5  3.5 - 5.2 g/dL Final    Globulin 01/19/2024 1.9  gm/dL Final    A/G Ratio 01/19/2024 2.4  g/dL Final    Total Bilirubin 01/19/2024 0.3  0.0 - 1.2 mg/dL Final    Alkaline Phosphatase 01/19/2024 68  39 - 117 U/L Final    AST (SGOT) 01/19/2024 26  1 - 32 U/L Final    ALT (SGPT) 01/19/2024 36 (H)  1 - 33 U/L Final    Total Cholesterol 01/19/2024 206 (H)  0 - 200 mg/dL Final    Comment: Cholesterol Reference Ranges  (U.S. Department of Health and Human Services ATP III  Classifications)  Desirable          <200 mg/dL  Borderline High    200-239 mg/dL  High Risk          >240 mg/dL  Triglyceride Reference Ranges  (U.S. Department of Health and Human Services ATP III  Classifications)  Normal           <150 mg/dL  Borderline High  150-199 mg/dL  High             200-499 mg/dL  Very High        >500 mg/dL  HDL Reference Ranges  (U.S. Department of Health and Human Services ATP III  Classifications)  Low     <40 mg/dl (major risk factor for CHD)  High    >60 mg/dl ('negative' risk factor for CHD)  LDL Reference Ranges  (U.S. Department of Health and Human Services ATP III  Classifications)  Optimal          <100 mg/dL  Near Optimal     100-129 mg/dL  Borderline High  130-159 mg/dL  High             160-189 mg/dL  Very High        >189 mg/dL      Triglycerides 01/19/2024 54  0 - 150 mg/dL Final    HDL Cholesterol 01/19/2024 77 (H)  40 - 60 mg/dL Final    VLDL Cholesterol Suhas 01/19/2024 10  5 - 40 mg/dL Final    LDL Chol Calc (NIH) 01/19/2024 119 (H)  0 - 100 mg/dL Final    Chol/HDL Ratio 01/19/2024 2.68   Final    TSH 01/19/2024 1.750  0.270 - 4.200 uIU/mL Final    Specific Gravity, UA 01/19/2024 1.024  1.005 - 1.030 Final    pH,  01/19/2024 5.5  5.0 - 7.5 Final    Color,  01/19/2024 Yellow  Yellow Final    Appearance,  01/19/2024 Cloudy (A)  Clear Final    Leukocytes,  01/19/2024  Negative  Negative Final    Protein 01/19/2024 Negative  Negative/Trace Final    Glucose, UA 01/19/2024 Negative  Negative Final    Ketones 01/19/2024 Negative  Negative Final    Blood, UA 01/19/2024 2+ (A)  Negative Final    Bilirubin, UA 01/19/2024 Negative  Negative Final    Urobilinogen, UA 01/19/2024 0.2  0.2 - 1.0 mg/dL Final    Nitrite, UA 01/19/2024 Negative  Negative Final    Microscopic Examination 01/19/2024 See below:   Final    Microscopic was indicated and was performed.    Urinalysis Reflex 01/19/2024 Comment   Final    This specimen will not reflex to a Urine Culture.    WBC, UA 01/19/2024 0-5  0 - 5 /hpf Final    RBC, UA 01/19/2024 3-10 (A)  0 - 2 /hpf Final    Epithelial Cells (non renal) 01/19/2024 >10 (A)  0 - 10 /hpf Final    Casts 01/19/2024 None seen  None seen /lpf Final    Bacteria, UA 01/19/2024 Few  None seen/Few /hpf Final    Vitamin B-12 01/19/2024 715  211 - 946 pg/mL Final    Results may be falsely increased if patient taking Biotin.    Written Authorization 01/19/2024 Comment   Final    Comment: Written Authorization Received.  Authorization received from Written Request 01-  Logged by Patty Bingham      25 Hydroxy, Vitamin D 01/19/2024 30.2  30.0 - 100.0 ng/ml Final    Comment: Reference Range for Total Vitamin D 25(OH)  Deficiency <20.0 ng/mL  Insufficiency 21-29 ng/mL  Sufficiency  ng/mL  Toxicity >100 ng/ml         Review of Symptoms:   Review of Systems   Constitutional:  Negative for activity change, appetite change, fatigue, unexpected weight gain and unexpected weight loss.   Respiratory:  Negative for shortness of breath and wheezing.    Gastrointestinal:  Negative for constipation, diarrhea, nausea and vomiting.   Musculoskeletal:  Negative for gait problem.   Skin:  Negative for dry skin and rash.   Neurological:  Negative for dizziness, speech difficulty, weakness, light-headedness, headache, memory problem and confusion.   Psychiatric/Behavioral:   Positive for stress. Negative for agitation, behavioral problems, decreased concentration, dysphoric mood, hallucinations, self-injury, sleep disturbance, suicidal ideas, negative for hyperactivity and depressed mood. The patient is nervous/anxious.        Physical Exam:   Physical Exam  Constitutional:       General: She is not in acute distress.     Appearance: She is well-developed. She is not diaphoretic.   HENT:      Head: Normocephalic and atraumatic.   Eyes:      Conjunctiva/sclera: Conjunctivae normal.   Pulmonary:      Effort: Pulmonary effort is normal. No respiratory distress.   Musculoskeletal:         General: Normal range of motion.      Cervical back: Full passive range of motion without pain and normal range of motion.   Neurological:      Mental Status: She is alert and oriented to person, place, and time.   Psychiatric:         Mood and Affect: Mood is anxious. Mood is not depressed. Affect is not labile, blunt, angry or inappropriate.         Speech: Speech is not rapid and pressured or tangential.         Behavior: Behavior normal. Behavior is not agitated, slowed, aggressive, withdrawn, hyperactive or combative. Behavior is cooperative.         Thought Content: Thought content normal. Thought content is not paranoid or delusional. Thought content does not include homicidal or suicidal ideation. Thought content does not include homicidal or suicidal plan.         Judgment: Judgment normal.       not currently breastfeeding.  There is no height or weight on file to calculate BMI. Video appt unable to obtain.     Mental Status Exam:   Appearance: appropriate  Hygiene:   good  Cooperation:  Cooperative  Eye Contact:  Good  Psychomotor Behavior:  Appropriate  Mood:anxious  Affect:  Appropriate  Hopelessness: Denies  Speech:  Normal  Thought Process:  Goal directed  Thought Content:  Normal  Suicidal:  None  Homicidal:  None  Hallucinations:  None  Delusion:  None  Memory:  Intact  Orientation:   Person, Place, Time, and Situation  Reliability:  good  Insight:  Good  Judgement:  Good  Impulse Control:  Good    PHQ-9 Depression Screening  Little interest or pleasure in doing things?     Feeling down, depressed, or hopeless?     Trouble falling or staying asleep, or sleeping too much?     Feeling tired or having little energy?     Poor appetite or overeating?     Feeling bad about yourself - or that you are a failure or have let yourself or your family down?     Trouble concentrating on things, such as reading the newspaper or watching television?     Moving or speaking so slowly that other people could have noticed? Or the opposite - being so fidgety or restless that you have been moving around a lot more than usual?     Thoughts that you would be better off dead, or of hurting yourself in some way?     PHQ-9 Total Score     If you checked off any problems, how difficult have these problems made it for you to do your work, take care of things at home, or get along with other people?        Assessment/Plan:   Diagnoses and all orders for this visit:    1. GETACHEW (generalized anxiety disorder) (Primary)  -     desvenlafaxine (Pristiq) 50 MG 24 hr tablet; Take 1 tablet by mouth Daily.  Dispense: 30 tablet; Refill: 1  -     ALPRAZolam (Xanax) 0.5 MG tablet; Take 1 tablet by mouth Daily As Needed for Anxiety.  Dispense: 30 tablet; Refill: 0  -     Urine Drug Screen - Urine, Clean Catch; Future    Discussed options with patient and patient was more than willing to try Pristiq 50 mg daily.  I feel it is also appropriate for patient to have Xanax 0.5 mg daily as needed only for anxiety.  Encouraged patient not to take more than 3 a week.  Patient verbalized understanding.  Will obtain a urine drug screen.    A psychological evaluation was conducted in order to assess past and current level of functioning. Areas assessed included, but were not limited to: perception of social support, perception of ability to face and  deal with challenges in life (positive functioning), anxiety symptoms, depressive symptoms, perspective on beliefs/belief system, coping skills for stress, intelligence level,  Therapeutic rapport was established. Interventions conducted today were geared towards incorporating medication management along with support for continued therapy. Education was also provided as to the med management with this provider and what to expect in subsequent sessions.    We discussed risks, benefits,goals and side effects of the above medication and the patient was agreeable with the plan.Patient was educated on the importance of compliance with treatment and follow-up appointments. Patient is aware to contact the [facility] [phone] with any worsening of symptoms. To call for questions or concerns and return early if necessary. Patent is agreeable to go to the Emergency Department or call 911 should they begin SI/HI.     Part of this note may be an electronic transcription/translation of spoken language to printed text using the Dragon Dictation System.    Return in about 4 weeks (around 4/9/2024) for Follow Up 30 min, Video visit.    RAMESH Stevens

## 2024-04-09 ENCOUNTER — LAB (OUTPATIENT)
Dept: LAB | Facility: HOSPITAL | Age: 44
End: 2024-04-09
Payer: COMMERCIAL

## 2024-04-09 PROCEDURE — 80306 DRUG TEST PRSMV INSTRMNT: CPT | Performed by: NURSE PRACTITIONER

## 2024-04-17 ENCOUNTER — TELEMEDICINE (OUTPATIENT)
Dept: PSYCHIATRY | Facility: CLINIC | Age: 44
End: 2024-04-17
Payer: COMMERCIAL

## 2024-04-17 DIAGNOSIS — F41.1 GAD (GENERALIZED ANXIETY DISORDER): Primary | ICD-10-CM

## 2024-04-17 PROCEDURE — 99214 OFFICE O/P EST MOD 30 MIN: CPT | Performed by: NURSE PRACTITIONER

## 2024-04-17 RX ORDER — ALPRAZOLAM 0.5 MG/1
0.5 TABLET ORAL DAILY PRN
Qty: 30 TABLET | Refills: 0 | Status: SHIPPED | OUTPATIENT
Start: 2024-04-17 | End: 2025-04-17

## 2024-04-17 RX ORDER — DESVENLAFAXINE 100 MG/1
100 TABLET, EXTENDED RELEASE ORAL DAILY
Qty: 30 TABLET | Refills: 1 | Status: SHIPPED | OUTPATIENT
Start: 2024-04-17

## 2024-05-04 DIAGNOSIS — E05.00 GRAVES DISEASE: ICD-10-CM

## 2024-05-06 RX ORDER — LEVOTHYROXINE SODIUM 112 UG/1
112 TABLET ORAL DAILY
Qty: 90 TABLET | Refills: 2 | Status: SHIPPED | OUTPATIENT
Start: 2024-05-06

## 2024-05-22 DIAGNOSIS — F41.1 GAD (GENERALIZED ANXIETY DISORDER): ICD-10-CM

## 2024-05-23 RX ORDER — ALPRAZOLAM 0.5 MG/1
0.5 TABLET ORAL DAILY PRN
Qty: 30 TABLET | Refills: 0 | Status: SHIPPED | OUTPATIENT
Start: 2024-05-23 | End: 2025-05-23

## 2024-06-19 DIAGNOSIS — F41.1 GAD (GENERALIZED ANXIETY DISORDER): ICD-10-CM

## 2024-06-19 RX ORDER — DESVENLAFAXINE 100 MG/1
100 TABLET, EXTENDED RELEASE ORAL DAILY
Qty: 30 TABLET | Refills: 0 | Status: SHIPPED | OUTPATIENT
Start: 2024-06-19

## 2024-06-19 RX ORDER — ALPRAZOLAM 0.5 MG/1
0.5 TABLET ORAL DAILY PRN
Qty: 30 TABLET | Refills: 0 | Status: SHIPPED | OUTPATIENT
Start: 2024-06-19 | End: 2025-06-19

## 2024-06-19 NOTE — TELEPHONE ENCOUNTER
Patient missed appointment by mistake, patient is requesting refills to hold over till next appointment on 07/18/24. Patient states she has enough Xanax till 06/23/2024 but does not have but a few pristiq left.   Patient was educated on no-show policy.

## 2024-07-18 ENCOUNTER — TRANSCRIBE ORDERS (OUTPATIENT)
Dept: ADMINISTRATIVE | Facility: HOSPITAL | Age: 44
End: 2024-07-18
Payer: COMMERCIAL

## 2024-07-18 ENCOUNTER — TELEPHONE (OUTPATIENT)
Dept: FAMILY MEDICINE CLINIC | Facility: CLINIC | Age: 44
End: 2024-07-18
Payer: COMMERCIAL

## 2024-07-18 ENCOUNTER — TELEMEDICINE (OUTPATIENT)
Dept: PSYCHIATRY | Facility: CLINIC | Age: 44
End: 2024-07-18
Payer: COMMERCIAL

## 2024-07-18 DIAGNOSIS — Z12.31 SCREENING MAMMOGRAM FOR BREAST CANCER: Primary | ICD-10-CM

## 2024-07-18 DIAGNOSIS — F41.1 GAD (GENERALIZED ANXIETY DISORDER): Primary | ICD-10-CM

## 2024-07-18 DIAGNOSIS — N64.4 BREAST PAIN: Primary | ICD-10-CM

## 2024-07-18 DIAGNOSIS — F41.1 GAD (GENERALIZED ANXIETY DISORDER): ICD-10-CM

## 2024-07-18 RX ORDER — DESVENLAFAXINE 100 MG/1
100 TABLET, EXTENDED RELEASE ORAL DAILY
Qty: 30 TABLET | Refills: 4 | Status: SHIPPED | OUTPATIENT
Start: 2024-07-18

## 2024-07-18 RX ORDER — DESVENLAFAXINE 100 MG/1
100 TABLET, EXTENDED RELEASE ORAL DAILY
Qty: 30 TABLET | Refills: 0 | OUTPATIENT
Start: 2024-07-18

## 2024-07-18 RX ORDER — ALPRAZOLAM 0.5 MG/1
0.5 TABLET ORAL DAILY PRN
Qty: 30 TABLET | Refills: 2 | Status: SHIPPED | OUTPATIENT
Start: 2024-07-18 | End: 2025-07-18

## 2024-07-18 NOTE — PROGRESS NOTES
Patient Name: Nazanin Telles  MRN: [id number]   :  1980     This provider is located at her home office through the [facility] (through [facility]), [address], [address] using a secure Number 100hart Video Visit through [employer]. Patient is being seen remotely via telehealth at their home address in [place], and stated they are in a secure environment for this session. The patient's condition being diagnosed/treated is appropriate for telemedicine. The provider identified herself as well as her credentials.   The patient, and/or patients guardian, consent to be seen remotely, and when consent is given they understand that the consent allows for patient identifiable information to be sent to a third party as needed.   They may refuse to be seen remotely at any time. The electronic data is encrypted and password protected, and the patient and/or guardian has been advised of the potential risks to privacy not withstanding such measures.    You have chosen to receive care through a telehealth visit.  Do you consent to use a video/audio connection for your medical care today? Yes    Chief Complaint:      ICD-10-CM ICD-9-CM   1. GETACHEW (generalized anxiety disorder)  F41.1 300.02       History of Present Illness: Nazanin Telles is a 44 y.o. female is here today for medication management follow up.  Patient states her mood is good.  She is more level every day.  Feels she is on a good combination.    The following portions of the patient's history were reviewed and updated as appropriate: allergies, current medications, past family history, past medical history, past social history, past surgical history, and problem list.    Review of Systems;;  Review of Systems   Constitutional:  Negative for activity change, appetite change, fatigue, unexpected weight gain and unexpected weight loss.   Respiratory:  Negative for shortness of breath and wheezing.     Gastrointestinal:  Negative for constipation, diarrhea, nausea and vomiting.   Musculoskeletal:  Negative for gait problem.   Skin:  Negative for dry skin and rash.   Neurological:  Negative for dizziness, speech difficulty, weakness, light-headedness, headache, memory problem and confusion.   Psychiatric/Behavioral:  Negative for agitation, behavioral problems, decreased concentration, dysphoric mood, hallucinations, self-injury, sleep disturbance, suicidal ideas, negative for hyperactivity, depressed mood and stress. The patient is not nervous/anxious.        Physical Exam;;  Physical Exam  Constitutional:       General: She is not in acute distress.     Appearance: She is well-developed. She is not diaphoretic.   HENT:      Head: Normocephalic and atraumatic.   Eyes:      Conjunctiva/sclera: Conjunctivae normal.   Pulmonary:      Effort: Pulmonary effort is normal. No respiratory distress.   Musculoskeletal:         General: Normal range of motion.      Cervical back: Full passive range of motion without pain and normal range of motion.   Neurological:      Mental Status: She is alert and oriented to person, place, and time.   Psychiatric:         Mood and Affect: Mood is not anxious or depressed. Affect is not labile, blunt, angry or inappropriate.         Speech: Speech is not rapid and pressured or tangential.         Behavior: Behavior normal. Behavior is not agitated, slowed, aggressive, withdrawn, hyperactive or combative. Behavior is cooperative.         Thought Content: Thought content normal. Thought content is not paranoid or delusional. Thought content does not include homicidal or suicidal ideation. Thought content does not include homicidal or suicidal plan.         Judgment: Judgment normal.       not currently breastfeeding.  There is no height or weight on file to calculate BMI. Video appt unable to obtain.     Current Medications;;    Current Outpatient Medications:     ALPRAZolam (Xanax) 0.5  MG tablet, Take 1 tablet by mouth Daily As Needed for Anxiety., Disp: 30 tablet, Rfl: 2    desvenlafaxine (Pristiq) 100 MG 24 hr tablet, Take 1 tablet by mouth Daily., Disp: 30 tablet, Rfl: 4    cyclobenzaprine (FLEXERIL) 5 MG tablet, Take 1 tablet by mouth 3 (Three) Times a Day As Needed for Muscle Spasms., Disp: 35 tablet, Rfl: 0    famotidine (Pepcid) 20 MG tablet, Take 1 tablet by mouth 2 (Two) Times a Day., Disp: 60 tablet, Rfl: 0    levothyroxine (SYNTHROID, LEVOTHROID) 112 MCG tablet, TAKE 1 TABLET BY MOUTH DAILY, Disp: 90 tablet, Rfl: 2    MAGNESIUM CHLORIDE PO, Take  by mouth., Disp: , Rfl:     melatonin 5 MG tablet tablet, Take 4 tablets by mouth Every Night., Disp: , Rfl:     propranolol (INDERAL) 20 MG tablet, Take 1 tablet by mouth 3 (Three) Times a Day., Disp: 270 tablet, Rfl: 3    Lab Results:   No visits with results within 3 Month(s) from this visit.   Latest known visit with results is:   Results Encounter on 03/12/2024   Component Date Value Ref Range Status    THC, Screen, Urine 04/09/2024 Positive (A)  Negative Final    Phencyclidine (PCP), Urine 04/09/2024 Negative  Negative Final    Cocaine Screen, Urine 04/09/2024 Negative  Negative Final    Methamphetamine, Ur 04/09/2024 Negative  Negative Final    Opiate Screen 04/09/2024 Negative  Negative Final    Amphetamine Screen, Urine 04/09/2024 Negative  Negative Final    Benzodiazepine Screen, Urine 04/09/2024 Negative  Negative Final    Tricyclic Antidepressants Screen 04/09/2024 Negative  Negative Final    Methadone Screen, Urine 04/09/2024 Negative  Negative Final    Barbiturates Screen, Urine 04/09/2024 Negative  Negative Final    Oxycodone Screen, Urine 04/09/2024 Negative  Negative Final    Buprenorphine, Screen, Urine 04/09/2024 Negative  Negative Final       Mental Status Exam:   Hygiene:   good  Cooperation:  Cooperative  Eye Contact:  Good  Psychomotor Behavior:  Appropriate  Mood:sad  Affect:  Appropriate  Hopelessness: Denies  Speech:   Normal  Thought Process:  Goal directed  Thought Content:  Normal  Suicidal:  None  Homicidal:  None  Hallucinations:  None  Delusion:  None  Memory:  Intact  Orientation:  Person, Place, Time, and Situation  Reliability:  good  Insight:  Good  Judgement:  Good  Impulse Control:  Good    PHQ-9 Depression Screening  Little interest or pleasure in doing things? (P) 1-->several days   Feeling down, depressed, or hopeless?     Trouble falling or staying asleep, or sleeping too much? (P) 0-->not at all   Feeling tired or having little energy? (P) 0-->not at all   Poor appetite or overeating? (P) 0-->not at all   Feeling bad about yourself - or that you are a failure or have let yourself or your family down? (P) 1-->several days   Trouble concentrating on things, such as reading the newspaper or watching television? (P) 0-->not at all   Moving or speaking so slowly that other people could have noticed? Or the opposite - being so fidgety or restless that you have been moving around a lot more than usual? (P) 0-->not at all   Thoughts that you would be better off dead, or of hurting yourself in some way? (P) 0-->not at all   PHQ-9 Total Score     If you checked off any problems, how difficult have these problems made it for you to do your work, take care of things at home, or get along with other people? (P) not difficult at all      REVIEWED [facility] # [id number]     Assessment/Plan:  Diagnoses and all orders for this visit:    1. GETACHEW (generalized anxiety disorder) (Primary)  -     ALPRAZolam (Xanax) 0.5 MG tablet; Take 1 tablet by mouth Daily As Needed for Anxiety.  Dispense: 30 tablet; Refill: 2  -     desvenlafaxine (Pristiq) 100 MG 24 hr tablet; Take 1 tablet by mouth Daily.  Dispense: 30 tablet; Refill: 4      Patient states she is feeling good.  Feels she is on a good combination.    A psychological evaluation was conducted in order to assess past and current level of functioning. Areas assessed included, but were not  limited to: perception of social support, perception of ability to face and deal with challenges in life (positive functioning), anxiety symptoms, depressive symptoms, perspective on beliefs/belief system, coping skills for stress, intelligence level,  Therapeutic rapport was established. Interventions conducted today were geared towards incorporating medication management along with support for continued therapy. Education was also provided as to the med management with this provider and what to expect in subsequent sessions.    We discussed risks, benefits,goals and side effects of the above medication and the patient was agreeable with the plan.Patient was educated on the importance of compliance with treatment and follow-up appointments. Patient is aware to contact the [facility] [phone] with any worsening of symptoms. To call for questions or concerns and return early if necessary. Patent is agreeable to go to the Emergency Department or call 911 should they begin SI/HI.     Part of this note may be an electronic transcription/translation of spoken language to printed text using the Dragon Dictation System.    Return in about 3 months (around 10/18/2024) for Follow Up 30 min.    RAMESH Stevens

## 2024-07-18 NOTE — TELEPHONE ENCOUNTER
Patient called and stated that she was trying to schedule her mammogram.   When she was describing some of her symptoms, such as pain, to the tech who was trying to schedule.  She was then told to call and ask for a Bilateral Diagnostic Mammogram.

## 2024-09-03 ENCOUNTER — HOSPITAL ENCOUNTER (OUTPATIENT)
Dept: ULTRASOUND IMAGING | Facility: HOSPITAL | Age: 44
Discharge: HOME | End: 2024-09-03
Payer: COMMERCIAL

## 2024-09-03 ENCOUNTER — HOSPITAL ENCOUNTER (OUTPATIENT)
Dept: MAMMOGRAPHY | Facility: HOSPITAL | Age: 44
Discharge: HOME | End: 2024-09-03
Payer: COMMERCIAL

## 2024-09-03 DIAGNOSIS — N64.4 BREAST PAIN: ICD-10-CM

## 2024-09-03 PROCEDURE — 76642 ULTRASOUND BREAST LIMITED: CPT

## 2024-09-03 PROCEDURE — G0279 TOMOSYNTHESIS, MAMMO: HCPCS

## 2024-09-03 PROCEDURE — 77066 DX MAMMO INCL CAD BI: CPT

## 2024-09-03 PROCEDURE — 77066 DX MAMMO INCL CAD BI: CPT | Performed by: RADIOLOGY

## 2024-09-03 PROCEDURE — 77062 BREAST TOMOSYNTHESIS BI: CPT | Performed by: RADIOLOGY

## 2024-09-03 PROCEDURE — 76642 ULTRASOUND BREAST LIMITED: CPT | Performed by: RADIOLOGY

## 2024-10-17 ENCOUNTER — TELEMEDICINE (OUTPATIENT)
Dept: PSYCHIATRY | Facility: CLINIC | Age: 44
End: 2024-10-17
Payer: COMMERCIAL

## 2024-10-17 DIAGNOSIS — F41.1 GAD (GENERALIZED ANXIETY DISORDER): ICD-10-CM

## 2024-10-17 DIAGNOSIS — F90.0 ATTENTION DEFICIT HYPERACTIVITY DISORDER (ADHD), PREDOMINANTLY INATTENTIVE TYPE: Primary | ICD-10-CM

## 2024-10-17 RX ORDER — DESVENLAFAXINE 100 MG/1
100 TABLET, EXTENDED RELEASE ORAL DAILY
Qty: 30 TABLET | Refills: 4 | Status: SHIPPED | OUTPATIENT
Start: 2024-10-17

## 2024-10-17 RX ORDER — ATOMOXETINE 25 MG/1
25 CAPSULE ORAL DAILY
Qty: 30 CAPSULE | Refills: 1 | Status: SHIPPED | OUTPATIENT
Start: 2024-10-17

## 2024-10-17 RX ORDER — ALPRAZOLAM 0.5 MG
0.5 TABLET ORAL DAILY PRN
Qty: 30 TABLET | Refills: 2 | Status: SHIPPED | OUTPATIENT
Start: 2024-10-17 | End: 2025-10-17

## 2024-10-17 NOTE — PROGRESS NOTES
Patient Name: Nazanin Telles  MRN: [id number]   :  1980     This provider is located at her home office through the [facility] ([facility]), [address], [address] using a secure ClearStreamhart Video Visit through [employer]. Patient is being seen remotely via telehealth at their home address in [place], and stated they are in a secure environment for this session. The patient's condition being diagnosed/treated is appropriate for telemedicine. The provider identified herself as well as her credentials.   The patient, and/or patients guardian, consent to be seen remotely, and when consent is given they understand that the consent allows for patient identifiable information to be sent to a third party as needed.   They may refuse to be seen remotely at any time. The electronic data is encrypted and password protected, and the patient and/or guardian has been advised of the potential risks to privacy not withstanding such measures.    You have chosen to receive care through a telehealth visit.  Do you consent to use a video/audio connection for your medical care today? Yes    Chief Complaint:      ICD-10-CM ICD-9-CM   1. Attention deficit hyperactivity disorder (ADHD), predominantly inattentive type  F90.0 314.00   2. GETACHEW (generalized anxiety disorder)  F41.1 300.02       History of Present Illness: Nazanin Telles is a 44 y.o. female is here today for medication management follow up.  Patient still having anxiety at work.  Has used Xanax during the day.  Having a difficult time managing everything.  Does not have trouble with sleep.  Is struggling with difficulty completing tasks and organization.    The following portions of the patient's history were reviewed and updated as appropriate: allergies, current medications, past family history, past medical history, past social history, past surgical history, and problem list.    Review of  Systems;;  Review of Systems   Constitutional:  Negative for activity change, appetite change, fatigue, unexpected weight gain and unexpected weight loss.   Respiratory:  Negative for shortness of breath and wheezing.    Gastrointestinal:  Negative for constipation, diarrhea, nausea and vomiting.   Musculoskeletal:  Negative for gait problem.   Skin:  Negative for dry skin and rash.   Neurological:  Negative for dizziness, speech difficulty, weakness, light-headedness, headache, memory problem and confusion.   Psychiatric/Behavioral:  Positive for decreased concentration and stress. Negative for agitation, behavioral problems, dysphoric mood, hallucinations, self-injury, sleep disturbance, suicidal ideas, negative for hyperactivity and depressed mood. The patient is not nervous/anxious.        Physical Exam;;  Physical Exam  Constitutional:       General: She is not in acute distress.     Appearance: She is well-developed. She is not diaphoretic.   HENT:      Head: Normocephalic and atraumatic.   Eyes:      Conjunctiva/sclera: Conjunctivae normal.   Pulmonary:      Effort: Pulmonary effort is normal. No respiratory distress.   Musculoskeletal:         General: Normal range of motion.      Cervical back: Full passive range of motion without pain and normal range of motion.   Neurological:      Mental Status: She is alert and oriented to person, place, and time.   Psychiatric:         Mood and Affect: Mood is not anxious or depressed. Affect is not labile, blunt, angry or inappropriate.         Speech: Speech is not rapid and pressured or tangential.         Behavior: Behavior normal. Behavior is not agitated, slowed, aggressive, withdrawn, hyperactive or combative. Behavior is cooperative.         Thought Content: Thought content normal. Thought content is not paranoid or delusional. Thought content does not include homicidal or suicidal ideation. Thought content does not include homicidal or suicidal plan.          Judgment: Judgment normal.       not currently breastfeeding.  There is no height or weight on file to calculate BMI. Video appt unable to obtain.     Current Medications;;    Current Outpatient Medications:     ALPRAZolam (Xanax) 0.5 MG tablet, Take 1 tablet by mouth Daily As Needed for Anxiety., Disp: 30 tablet, Rfl: 2    desvenlafaxine (Pristiq) 100 MG 24 hr tablet, Take 1 tablet by mouth Daily., Disp: 30 tablet, Rfl: 4    atomoxetine (Strattera) 25 MG capsule, Take 1 capsule by mouth Daily., Disp: 30 capsule, Rfl: 1    cyclobenzaprine (FLEXERIL) 5 MG tablet, Take 1 tablet by mouth 3 (Three) Times a Day As Needed for Muscle Spasms., Disp: 35 tablet, Rfl: 0    famotidine (Pepcid) 20 MG tablet, Take 1 tablet by mouth 2 (Two) Times a Day., Disp: 60 tablet, Rfl: 0    levothyroxine (SYNTHROID, LEVOTHROID) 112 MCG tablet, TAKE 1 TABLET BY MOUTH DAILY, Disp: 90 tablet, Rfl: 2    MAGNESIUM CHLORIDE PO, Take  by mouth., Disp: , Rfl:     melatonin 5 MG tablet tablet, Take 4 tablets by mouth Every Night., Disp: , Rfl:     propranolol (INDERAL) 20 MG tablet, Take 1 tablet by mouth 3 (Three) Times a Day., Disp: 270 tablet, Rfl: 3    Lab Results:   No visits with results within 3 Month(s) from this visit.   Latest known visit with results is:   Results Encounter on 03/12/2024   Component Date Value Ref Range Status    THC, Screen, Urine 04/09/2024 Positive (A)  Negative Final    Phencyclidine (PCP), Urine 04/09/2024 Negative  Negative Final    Cocaine Screen, Urine 04/09/2024 Negative  Negative Final    Methamphetamine, Ur 04/09/2024 Negative  Negative Final    Opiate Screen 04/09/2024 Negative  Negative Final    Amphetamine Screen, Urine 04/09/2024 Negative  Negative Final    Benzodiazepine Screen, Urine 04/09/2024 Negative  Negative Final    Tricyclic Antidepressants Screen 04/09/2024 Negative  Negative Final    Methadone Screen, Urine 04/09/2024 Negative  Negative Final    Barbiturates Screen, Urine 04/09/2024 Negative   Negative Final    Oxycodone Screen, Urine 04/09/2024 Negative  Negative Final    Buprenorphine, Screen, Urine 04/09/2024 Negative  Negative Final       Mental Status Exam:   Hygiene:   good  Cooperation:  Cooperative  Eye Contact:  Good  Psychomotor Behavior:  Appropriate  Mood:within normal limits  Affect:  Appropriate  Hopelessness: Denies  Speech:  Normal  Thought Process:  Goal directed  Thought Content:  Normal  Suicidal:  None  Homicidal:  None  Hallucinations:  None  Delusion:  None  Memory:  Intact  Orientation:  Person, Place, Time, and Situation  Reliability:  good  Insight:  Good  Judgement:  Good  Impulse Control:  Good    PHQ-9 Depression Screening  Little interest or pleasure in doing things?     Feeling down, depressed, or hopeless?     PHQ-2 Total Score     Trouble falling or staying asleep, or sleeping too much?     Feeling tired or having little energy?     Poor appetite or overeating?     Feeling bad about yourself - or that you are a failure or have let yourself or your family down?     Trouble concentrating on things, such as reading the newspaper or watching television?     Moving or speaking so slowly that other people could have noticed? Or the opposite - being so fidgety or restless that you have been moving around a lot more than usual?     Thoughts that you would be better off dead, or of hurting yourself in some way?     PHQ-9 Total Score     If you checked off any problems, how difficult have these problems made it for you to do your work, take care of things at home, or get along with other people?       REVIEWED [facility] # [id number]       Assessment/Plan:  Diagnoses and all orders for this visit:    1. Attention deficit hyperactivity disorder (ADHD), predominantly inattentive type (Primary)  -     atomoxetine (Strattera) 25 MG capsule; Take 1 capsule by mouth Daily.  Dispense: 30 capsule; Refill: 1    2. GETACHEW (generalized anxiety disorder)  -     ALPRAZolam (Xanax) 0.5 MG tablet; Take 1  tablet by mouth Daily As Needed for Anxiety.  Dispense: 30 tablet; Refill: 2  -     desvenlafaxine (Pristiq) 100 MG 24 hr tablet; Take 1 tablet by mouth Daily.  Dispense: 30 tablet; Refill: 4      Patient struggling with signs of ADHD.  We will start Strattera 25 mg every evening.  We will follow-up in a month.    A psychological evaluation was conducted in order to assess past and current level of functioning. Areas assessed included, but were not limited to: perception of social support, perception of ability to face and deal with challenges in life (positive functioning), anxiety symptoms, depressive symptoms, perspective on beliefs/belief system, coping skills for stress, intelligence level,  Therapeutic rapport was established. Interventions conducted today were geared towards incorporating medication management along with support for continued therapy. Education was also provided as to the med management with this provider and what to expect in subsequent sessions.    We discussed risks, benefits,goals and side effects of the above medication and the patient was agreeable with the plan.Patient was educated on the importance of compliance with treatment and follow-up appointments. Patient is aware to contact the [facility] [phone] with any worsening of symptoms. To call for questions or concerns and return early if necessary. Patent is agreeable to go to the Emergency Department or call 911 should they begin SI/HI.     Part of this note may be an electronic transcription/translation of spoken language to printed text using the Dragon Dictation System.    Return in about 4 weeks (around 11/14/2024) for Follow Up 30 min, Video visit.    RAMESH Stevens

## 2024-10-21 ENCOUNTER — PRIOR AUTHORIZATION (OUTPATIENT)
Dept: PSYCHIATRY | Facility: CLINIC | Age: 44
End: 2024-10-21
Payer: COMMERCIAL

## 2024-11-12 DIAGNOSIS — F90.0 ATTENTION DEFICIT HYPERACTIVITY DISORDER (ADHD), PREDOMINANTLY INATTENTIVE TYPE: ICD-10-CM

## 2024-11-12 RX ORDER — ATOMOXETINE 40 MG/1
40 CAPSULE ORAL DAILY
Qty: 30 CAPSULE | Refills: 2 | Status: SHIPPED | OUTPATIENT
Start: 2024-11-12 | End: 2025-11-12

## 2024-11-12 RX ORDER — ATOMOXETINE 25 MG/1
25 CAPSULE ORAL DAILY
Qty: 30 CAPSULE | Refills: 1 | Status: CANCELLED | OUTPATIENT
Start: 2024-11-12

## 2024-11-25 ENCOUNTER — TELEMEDICINE (OUTPATIENT)
Dept: PSYCHIATRY | Facility: CLINIC | Age: 44
End: 2024-11-25
Payer: COMMERCIAL

## 2024-11-25 DIAGNOSIS — F41.1 GAD (GENERALIZED ANXIETY DISORDER): Primary | ICD-10-CM

## 2024-11-25 DIAGNOSIS — F90.0 ATTENTION DEFICIT HYPERACTIVITY DISORDER (ADHD), PREDOMINANTLY INATTENTIVE TYPE: ICD-10-CM

## 2024-11-25 RX ORDER — ALPRAZOLAM 0.5 MG
0.5 TABLET ORAL DAILY PRN
Qty: 30 TABLET | Refills: 2 | Status: SHIPPED | OUTPATIENT
Start: 2024-11-25 | End: 2025-11-25

## 2024-11-25 NOTE — PROGRESS NOTES
Patient Name: Nazanin Telles  MRN: [id number]   :  1980     This provider is located at her home office through the [facility] (through [facility]), [address] [place], [address] using a secure [employer] Video Visit through [employer]. Patient is being seen remotely via telehealth at their home address in [place], and stated they are in a secure environment for this session. The patient's condition being diagnosed/treated is appropriate for telemedicine. The provider identified herself as well as her credentials.   The patient, and/or patients guardian, consent to be seen remotely, and when consent is given they understand that the consent allows for patient identifiable information to be sent to a third party as needed.   They may refuse to be seen remotely at any time. The electronic data is encrypted and password protected, and the patient and/or guardian has been advised of the potential risks to privacy not withstanding such measures.    You have chosen to receive care through a telehealth visit.  Do you consent to use a video/audio connection for your medical care today? Yes    Chief Complaint:      ICD-10-CM ICD-9-CM   1. GETACHEW (generalized anxiety disorder)  F41.1 300.02   2. Attention deficit hyperactivity disorder (ADHD), predominantly inattentive type  F90.0 314.00       History of Present Illness: Nazanin Telles is a 45 y.o. female is here today for medication management follow up.  Patient feels Strattera is helping focus and task completion.  Feels anxiety is under good control as well.    The following portions of the patient's history were reviewed and updated as appropriate: allergies, current medications, past family history, past medical history, past social history, past surgical history, and problem list.    Review of Systems;;  Review of Systems   Constitutional:  Negative for activity change, appetite change, fatigue, unexpected weight  gain and unexpected weight loss.   Respiratory:  Negative for shortness of breath and wheezing.    Gastrointestinal:  Negative for constipation, diarrhea, nausea and vomiting.   Musculoskeletal:  Negative for gait problem.   Skin:  Negative for dry skin and rash.   Neurological:  Negative for dizziness, speech difficulty, weakness, light-headedness, headache, memory problem and confusion.   Psychiatric/Behavioral:  Negative for agitation, behavioral problems, decreased concentration, dysphoric mood, hallucinations, self-injury, sleep disturbance, suicidal ideas, negative for hyperactivity, depressed mood and stress. The patient is not nervous/anxious.        Physical Exam;;  Physical Exam  Constitutional:       General: She is not in acute distress.     Appearance: She is well-developed. She is not diaphoretic.   HENT:      Head: Normocephalic and atraumatic.   Eyes:      Conjunctiva/sclera: Conjunctivae normal.   Pulmonary:      Effort: Pulmonary effort is normal. No respiratory distress.   Musculoskeletal:         General: Normal range of motion.      Cervical back: Full passive range of motion without pain and normal range of motion.   Neurological:      Mental Status: She is alert and oriented to person, place, and time.   Psychiatric:         Mood and Affect: Mood is not anxious or depressed. Affect is not labile, blunt, angry or inappropriate.         Speech: Speech is not rapid and pressured or tangential.         Behavior: Behavior normal. Behavior is not agitated, slowed, aggressive, withdrawn, hyperactive or combative. Behavior is cooperative.         Thought Content: Thought content normal. Thought content is not paranoid or delusional. Thought content does not include homicidal or suicidal ideation. Thought content does not include homicidal or suicidal plan.         Judgment: Judgment normal.       not currently breastfeeding.  There is no height or weight on file to calculate BMI. Video appt unable to  obtain.     Current Medications;;    Current Outpatient Medications:     ALPRAZolam (Xanax) 0.5 MG tablet, Take 1 tablet by mouth Daily As Needed for Anxiety., Disp: 30 tablet, Rfl: 2    atomoxetine (Strattera) 80 MG capsule, Take 1 capsule by mouth Daily., Disp: 30 capsule, Rfl: 1    clobetasol (TEMOVATE) 0.05 % external solution, Apply  topically to the appropriate area as directed 2 (Two) Times a Day., Disp: 50 mL, Rfl: 0    desvenlafaxine (Pristiq) 100 MG 24 hr tablet, Take 1 tablet by mouth Daily., Disp: 30 tablet, Rfl: 4    diazePAM (Valium) 2 MG tablet, Take 1 tablet by mouth Daily As Needed for Anxiety., Disp: 14 tablet, Rfl: 0    Diclofenac Sodium (VOLTAREN) 1 % gel gel, Apply 4 g topically to the appropriate area as directed 4 (Four) Times a Day As Needed (joint pain)., Disp: 100 g, Rfl: 0    levothyroxine (SYNTHROID, LEVOTHROID) 112 MCG tablet, TAKE 1 TABLET BY MOUTH DAILY, Disp: 90 tablet, Rfl: 2    melatonin 5 MG tablet tablet, Take 4 tablets by mouth Every Night., Disp: , Rfl:     propranolol (INDERAL) 20 MG tablet, Take 1 tablet by mouth 3 (Three) Times a Day. (Patient taking differently: Take 1 tablet by mouth 2 (Two) Times a Day.), Disp: 270 tablet, Rfl: 3    Lab Results:   No visits with results within 3 Month(s) from this visit.   Latest known visit with results is:   Results Encounter on 03/12/2024   Component Date Value Ref Range Status    THC, Screen, Urine 04/09/2024 Positive (A)  Negative Final    Phencyclidine (PCP), Urine 04/09/2024 Negative  Negative Final    Cocaine Screen, Urine 04/09/2024 Negative  Negative Final    Methamphetamine, Ur 04/09/2024 Negative  Negative Final    Opiate Screen 04/09/2024 Negative  Negative Final    Amphetamine Screen, Urine 04/09/2024 Negative  Negative Final    Benzodiazepine Screen, Urine 04/09/2024 Negative  Negative Final    Tricyclic Antidepressants Screen 04/09/2024 Negative  Negative Final    Methadone Screen, Urine 04/09/2024 Negative  Negative Final     Barbiturates Screen, Urine 04/09/2024 Negative  Negative Final    Oxycodone Screen, Urine 04/09/2024 Negative  Negative Final    Buprenorphine, Screen, Urine 04/09/2024 Negative  Negative Final       Mental Status Exam:   Hygiene:   good  Cooperation:  Cooperative  Eye Contact:  Good  Psychomotor Behavior:  Appropriate  Mood:within normal limits  Affect:  Appropriate  Hopelessness: Denies  Speech:  Normal  Thought Process:  Goal directed  Thought Content:  Normal  Suicidal:  None  Homicidal:  None  Hallucinations:  None  Delusion:  None  Memory:  Intact  Orientation:  Person, Place, Time, and Situation  Reliability:  good  Insight:  Good  Judgement:  Good  Impulse Control:  Good    PHQ-9 Depression Screening  Little interest or pleasure in doing things?     Feeling down, depressed, or hopeless?     PHQ-2 Total Score     Trouble falling or staying asleep, or sleeping too much?     Feeling tired or having little energy?     Poor appetite or overeating?     Feeling bad about yourself - or that you are a failure or have let yourself or your family down?     Trouble concentrating on things, such as reading the newspaper or watching television?     Moving or speaking so slowly that other people could have noticed? Or the opposite - being so fidgety or restless that you have been moving around a lot more than usual?     Thoughts that you would be better off dead, or of hurting yourself in some way?     PHQ-9 Total Score     If you checked off any problems, how difficult have these problems made it for you to do your work, take care of things at home, or get along with other people?           Assessment/Plan:  Diagnoses and all orders for this visit:    1. GETACHEW (generalized anxiety disorder) (Primary)  -     ALPRAZolam (Xanax) 0.5 MG tablet; Take 1 tablet by mouth Daily As Needed for Anxiety.  Dispense: 30 tablet; Refill: 2    2. Attention deficit hyperactivity disorder (ADHD), predominantly inattentive type      Patient  feels overall focus and task completion are improving slightly.  Anxiety is under control.  We will continue medication as ordered and follow-up in 2 months.    A psychological evaluation was conducted in order to assess past and current level of functioning. Areas assessed included, but were not limited to: perception of social support, perception of ability to face and deal with challenges in life (positive functioning), anxiety symptoms, depressive symptoms, perspective on beliefs/belief system, coping skills for stress, intelligence level,  Therapeutic rapport was established. Interventions conducted today were geared towards incorporating medication management along with support for continued therapy. Education was also provided as to the med management with this provider and what to expect in subsequent sessions.    We discussed risks, benefits,goals and side effects of the above medication and the patient was agreeable with the plan.Patient was educated on the importance of compliance with treatment and follow-up appointments. Patient is aware to contact the [facility] [phone] with any worsening of symptoms. To call for questions or concerns and return early if necessary. Patent is agreeable to go to the Emergency Department or call 911 should they begin SI/HI.     Part of this note may be an electronic transcription/translation of spoken language to printed text using the Dragon Dictation System.    Return in about 2 months (around 1/25/2025) for Follow Up 30 min, Video visit.    RAMESH Stevens

## 2025-01-20 DIAGNOSIS — R71.8 ELEVATED MCV: ICD-10-CM

## 2025-01-20 DIAGNOSIS — Z00.00 ANNUAL PHYSICAL EXAM: ICD-10-CM

## 2025-01-20 DIAGNOSIS — F41.9 ANXIETY: ICD-10-CM

## 2025-01-20 DIAGNOSIS — Z11.59 ENCOUNTER FOR HEPATITIS C SCREENING TEST FOR LOW RISK PATIENT: ICD-10-CM

## 2025-01-20 DIAGNOSIS — E55.9 VITAMIN D DEFICIENCY: ICD-10-CM

## 2025-01-20 DIAGNOSIS — I10 UNCONTROLLED HYPERTENSION: ICD-10-CM

## 2025-01-22 LAB
25(OH)D3+25(OH)D2 SERPL-MCNC: 31.3 NG/ML (ref 30–100)
ALBUMIN SERPL-MCNC: 4.3 G/DL (ref 3.9–4.9)
ALP SERPL-CCNC: 71 IU/L (ref 44–121)
ALT SERPL-CCNC: 37 IU/L (ref 0–32)
AST SERPL-CCNC: 28 IU/L (ref 0–40)
BILIRUB SERPL-MCNC: 0.4 MG/DL (ref 0–1.2)
BUN SERPL-MCNC: 10 MG/DL (ref 6–24)
BUN/CREAT SERPL: 15 (ref 9–23)
CALCIUM SERPL-MCNC: 9.1 MG/DL (ref 8.7–10.2)
CHLORIDE SERPL-SCNC: 102 MMOL/L (ref 96–106)
CHOLEST SERPL-MCNC: 204 MG/DL (ref 100–199)
CHOLEST/HDLC SERPL: 2.6 RATIO (ref 0–4.4)
CO2 SERPL-SCNC: 25 MMOL/L (ref 20–29)
CREAT SERPL-MCNC: 0.66 MG/DL (ref 0.57–1)
EGFRCR SERPLBLD CKD-EPI 2021: 109 ML/MIN/1.73
ERYTHROCYTE [DISTWIDTH] IN BLOOD BY AUTOMATED COUNT: 11.7 % (ref 11.7–15.4)
FOLATE SERPL-MCNC: 2.4 NG/ML
GLOBULIN SER CALC-MCNC: 2.1 G/DL (ref 1.5–4.5)
GLUCOSE SERPL-MCNC: 95 MG/DL (ref 70–99)
HCT VFR BLD AUTO: 41.2 % (ref 34–46.6)
HCV AB SERPL QL IA: NORMAL
HCV IGG SERPL QL IA: NON REACTIVE
HDLC SERPL-MCNC: 77 MG/DL
HGB BLD-MCNC: 13.9 G/DL (ref 11.1–15.9)
LDLC SERPL CALC-MCNC: 115 MG/DL (ref 0–99)
MCH RBC QN AUTO: 34.7 PG (ref 26.6–33)
MCHC RBC AUTO-ENTMCNC: 33.7 G/DL (ref 31.5–35.7)
MCV RBC AUTO: 103 FL (ref 79–97)
PLATELET # BLD AUTO: 292 X10E3/UL (ref 150–450)
POTASSIUM SERPL-SCNC: 4.4 MMOL/L (ref 3.5–5.2)
PROT SERPL-MCNC: 6.4 G/DL (ref 6–8.5)
RBC # BLD AUTO: 4.01 X10E6/UL (ref 3.77–5.28)
SODIUM SERPL-SCNC: 140 MMOL/L (ref 134–144)
T4 FREE SERPL-MCNC: 1.73 NG/DL (ref 0.82–1.77)
TRIGL SERPL-MCNC: 64 MG/DL (ref 0–149)
TSH SERPL DL<=0.005 MIU/L-ACNC: 0.06 UIU/ML (ref 0.45–4.5)
VIT B12 SERPL-MCNC: 596 PG/ML (ref 232–1245)
VLDLC SERPL CALC-MCNC: 12 MG/DL (ref 5–40)
WBC # BLD AUTO: 7.1 X10E3/UL (ref 3.4–10.8)

## 2025-01-27 ENCOUNTER — TELEMEDICINE (OUTPATIENT)
Dept: PSYCHIATRY | Facility: CLINIC | Age: 45
End: 2025-01-27
Payer: COMMERCIAL

## 2025-01-27 DIAGNOSIS — F90.0 ATTENTION DEFICIT HYPERACTIVITY DISORDER (ADHD), PREDOMINANTLY INATTENTIVE TYPE: ICD-10-CM

## 2025-01-27 DIAGNOSIS — F41.1 GAD (GENERALIZED ANXIETY DISORDER): Primary | ICD-10-CM

## 2025-01-27 PROCEDURE — 99214 OFFICE O/P EST MOD 30 MIN: CPT | Performed by: NURSE PRACTITIONER

## 2025-01-27 RX ORDER — ATOMOXETINE 80 MG/1
80 CAPSULE ORAL DAILY
Qty: 30 CAPSULE | Refills: 1 | Status: SHIPPED | OUTPATIENT
Start: 2025-01-27

## 2025-01-27 RX ORDER — DIAZEPAM 2 MG/1
2 TABLET ORAL DAILY PRN
Qty: 14 TABLET | Refills: 0 | Status: SHIPPED | OUTPATIENT
Start: 2025-01-27 | End: 2026-01-27

## 2025-01-27 RX ORDER — DESVENLAFAXINE 100 MG/1
100 TABLET, EXTENDED RELEASE ORAL DAILY
Qty: 30 TABLET | Refills: 4 | Status: SHIPPED | OUTPATIENT
Start: 2025-01-27

## 2025-01-27 NOTE — PROGRESS NOTES
Patient Name: Nazanin Telles  MRN: [id number]   :  1980     This provider is located at her home office through the [facility] (through [facility]), [address], [address] using a secure [employer] Video Visit through [employer]. Patient is being seen remotely via telehealth at their home address in [place], and stated they are in a secure environment for this session. The patient's condition being diagnosed/treated is appropriate for telemedicine. The provider identified herself as well as her credentials.   The patient, and/or patients guardian, consent to be seen remotely, and when consent is given they understand that the consent allows for patient identifiable information to be sent to a third party as needed.   They may refuse to be seen remotely at any time. The electronic data is encrypted and password protected, and the patient and/or guardian has been advised of the potential risks to privacy not withstanding such measures.    You have chosen to receive care through a telehealth visit.  Do you consent to use a video/audio connection for your medical care today? Yes    Chief Complaint:      ICD-10-CM ICD-9-CM   1. GETACHEW (generalized anxiety disorder)  F41.1 300.02   2. Attention deficit hyperactivity disorder (ADHD), predominantly inattentive type  F90.0 314.00       History of Present Illness: Nazanin Telles is a 45 y.o. female is here today for medication management follow up.  Patient states focus is no better with the recent increase of Strattera.  Patient is also noticing an increase in anxiety.  Would like to discuss medication adjustments.    The following portions of the patient's history were reviewed and updated as appropriate: allergies, current medications, past family history, past medical history, past social history, past surgical history, and problem list.    Review of Systems;;  Review of Systems   Constitutional:  Negative  for activity change, appetite change, fatigue, unexpected weight gain and unexpected weight loss.   Respiratory:  Negative for shortness of breath and wheezing.    Gastrointestinal:  Negative for constipation, diarrhea, nausea and vomiting.   Musculoskeletal:  Negative for gait problem.   Skin:  Negative for dry skin and rash.   Neurological:  Negative for dizziness, speech difficulty, weakness, light-headedness, headache, memory problem and confusion.   Psychiatric/Behavioral:  Positive for decreased concentration and stress. Negative for agitation, behavioral problems, dysphoric mood, hallucinations, self-injury, sleep disturbance, suicidal ideas, negative for hyperactivity and depressed mood. The patient is nervous/anxious.        Physical Exam;;  Physical Exam  Constitutional:       General: She is not in acute distress.     Appearance: She is well-developed. She is not diaphoretic.   HENT:      Head: Normocephalic and atraumatic.   Eyes:      Conjunctiva/sclera: Conjunctivae normal.   Pulmonary:      Effort: Pulmonary effort is normal. No respiratory distress.   Musculoskeletal:         General: Normal range of motion.      Cervical back: Full passive range of motion without pain and normal range of motion.   Neurological:      Mental Status: She is alert and oriented to person, place, and time.   Psychiatric:         Mood and Affect: Mood is anxious. Mood is not depressed. Affect is not labile, blunt, angry or inappropriate.         Speech: Speech is not rapid and pressured or tangential.         Behavior: Behavior normal. Behavior is not agitated, slowed, aggressive, withdrawn, hyperactive or combative. Behavior is cooperative.         Thought Content: Thought content normal. Thought content is not paranoid or delusional. Thought content does not include homicidal or suicidal ideation. Thought content does not include homicidal or suicidal plan.         Judgment: Judgment normal.       not currently  breastfeeding.  There is no height or weight on file to calculate BMI. Video appt unable to obtain.     Current Medications;;    Current Outpatient Medications:     desvenlafaxine (Pristiq) 100 MG 24 hr tablet, Take 1 tablet by mouth Daily., Disp: 30 tablet, Rfl: 4    ALPRAZolam (Xanax) 0.5 MG tablet, Take 1 tablet by mouth Daily As Needed for Anxiety., Disp: 30 tablet, Rfl: 2    atomoxetine (Strattera) 80 MG capsule, Take 1 capsule by mouth Daily., Disp: 30 capsule, Rfl: 1    clobetasol (TEMOVATE) 0.05 % external solution, Apply  topically to the appropriate area as directed 2 (Two) Times a Day., Disp: 50 mL, Rfl: 0    diazePAM (Valium) 2 MG tablet, Take 1 tablet by mouth Daily As Needed for Anxiety., Disp: 14 tablet, Rfl: 0    Diclofenac Sodium (VOLTAREN) 1 % gel gel, Apply 4 g topically to the appropriate area as directed 4 (Four) Times a Day As Needed (joint pain)., Disp: 100 g, Rfl: 0    levothyroxine (SYNTHROID, LEVOTHROID) 112 MCG tablet, TAKE 1 TABLET BY MOUTH DAILY, Disp: 90 tablet, Rfl: 2    melatonin 5 MG tablet tablet, Take 4 tablets by mouth Every Night., Disp: , Rfl:     propranolol (INDERAL) 20 MG tablet, Take 1 tablet by mouth 3 (Three) Times a Day. (Patient taking differently: Take 1 tablet by mouth 2 (Two) Times a Day.), Disp: 270 tablet, Rfl: 3    Lab Results:   Orders Only on 01/20/2025   Component Date Value Ref Range Status    Hepatitis C Ab 01/21/2025 Non Reactive  Non Reactive Final    25 Hydroxy, Vitamin D 01/21/2025 31.3  30.0 - 100.0 ng/mL Final    Comment: Vitamin D deficiency has been defined by the [employer] of  Medicine and an Endocrine Society practice guideline as a  level of serum 25-OH vitamin D less than 20 ng/mL (1,2).  The Endocrine Society went on to further define vitamin D  insufficiency as a level between 21 and 29 ng/mL (2).  1. IOM ([employer]). 2010. Dietary reference     intakes for calcium and D. [place]: The     National Academies Press.  2. Torsten NARAYAN,  George HUIZAR, David LAGOS, et al.     Evaluation, treatment, and prevention of vitamin D     deficiency: an Endocrine Society clinical practice     guideline. JCEM. 2011 Jul; 96(7):1911-30.      Vitamin B-12 01/21/2025 596  232 - 1,245 pg/mL Final    Folate 01/21/2025 2.4 (L)  >3.0 ng/mL Final    Comment: A serum folate concentration of less than 3.1 ng/mL is  considered to represent clinical deficiency.      TSH 01/21/2025 0.061 (L)  0.450 - 4.500 uIU/mL Final    Total Cholesterol 01/21/2025 204 (H)  100 - 199 mg/dL Final    Triglycerides 01/21/2025 64  0 - 149 mg/dL Final    HDL Cholesterol 01/21/2025 77  >39 mg/dL Final    VLDL Cholesterol Suhas 01/21/2025 12  5 - 40 mg/dL Final    LDL Chol Calc (NIH) 01/21/2025 115 (H)  0 - 99 mg/dL Final    Chol/HDL Ratio 01/21/2025 2.6  0.0 - 4.4 ratio Final    Comment:                                   T. Chol/HDL Ratio                                              Men  Women                                1/2 Avg.Risk  3.4    3.3                                    Avg.Risk  5.0    4.4                                 2X Avg.Risk  9.6    7.1                                 3X Avg.Risk 23.4   11.0      Glucose 01/21/2025 95  70 - 99 mg/dL Final    BUN 01/21/2025 10  6 - 24 mg/dL Final    Creatinine 01/21/2025 0.66  0.57 - 1.00 mg/dL Final    EGFR Result 01/21/2025 109  >59 mL/min/1.73 Final    BUN/Creatinine Ratio 01/21/2025 15  9 - 23 Final    Sodium 01/21/2025 140  134 - 144 mmol/L Final    Potassium 01/21/2025 4.4  3.5 - 5.2 mmol/L Final    Chloride 01/21/2025 102  96 - 106 mmol/L Final    Total CO2 01/21/2025 25  20 - 29 mmol/L Final    Calcium 01/21/2025 9.1  8.7 - 10.2 mg/dL Final    Total Protein 01/21/2025 6.4  6.0 - 8.5 g/dL Final    Albumin 01/21/2025 4.3  3.9 - 4.9 g/dL Final    Globulin 01/21/2025 2.1  1.5 - 4.5 g/dL Final    Total Bilirubin 01/21/2025 0.4  0.0 - 1.2 mg/dL Final    Alkaline Phosphatase 01/21/2025 71  44 - 121 IU/L Final    AST (SGOT) 01/21/2025  28  0 - 40 IU/L Final    ALT (SGPT) 01/21/2025 37 (H)  0 - 32 IU/L Final    WBC 01/21/2025 7.1  3.4 - 10.8 x10E3/uL Final    RBC 01/21/2025 4.01  3.77 - 5.28 x10E6/uL Final    Hemoglobin 01/21/2025 13.9  11.1 - 15.9 g/dL Final    Hematocrit 01/21/2025 41.2  34.0 - 46.6 % Final    MCV 01/21/2025 103 (H)  79 - 97 fL Final    MCH 01/21/2025 34.7 (H)  26.6 - 33.0 pg Final    MCHC 01/21/2025 33.7  31.5 - 35.7 g/dL Final    RDW 01/21/2025 11.7  11.7 - 15.4 % Final    Platelets 01/21/2025 292  150 - 450 x10E3/uL Final    Interpretation 01/21/2025 Comment   Final    Comment: Not infected with HCV unless early or acute infection is  suspected (which may be delayed in an immunocompromised  individual), or other evidence exists to indicate HCV infection.      Free T4 01/21/2025 1.73  0.82 - 1.77 ng/dL Final       Mental Status Exam:   Hygiene:   good  Cooperation:  Cooperative  Eye Contact:  Good  Psychomotor Behavior:  Appropriate  Mood: anxious  Affect:  Appropriate  Hopelessness: Denies  Speech:  Normal  Thought Process:  Goal directed  Thought Content:  Normal  Suicidal:  None  Homicidal:  None  Hallucinations:  None  Delusion:  None  Memory:  Intact  Orientation:  Person, Place, Time, and Situation  Reliability:  good  Insight:  Good  Judgement:  Good  Impulse Control:  Good    PHQ-9 Depression Screening  Little interest or pleasure in doing things?     Feeling down, depressed, or hopeless?     PHQ-2 Total Score     Trouble falling or staying asleep, or sleeping too much?     Feeling tired or having little energy?     Poor appetite or overeating?     Feeling bad about yourself - or that you are a failure or have let yourself or your family down?     Trouble concentrating on things, such as reading the newspaper or watching television?     Moving or speaking so slowly that other people could have noticed? Or the opposite - being so fidgety or restless that you have been moving around a lot more than usual?     Thoughts  that you would be better off dead, or of hurting yourself in some way?     PHQ-9 Total Score     If you checked off any problems, how difficult have these problems made it for you to do your work, take care of things at home, or get along with other people?           Assessment/Plan:  Diagnoses and all orders for this visit:    1. GETACHEW (generalized anxiety disorder) (Primary)  -     diazePAM (Valium) 2 MG tablet; Take 1 tablet by mouth Daily As Needed for Anxiety.  Dispense: 14 tablet; Refill: 0  -     desvenlafaxine (Pristiq) 100 MG 24 hr tablet; Take 1 tablet by mouth Daily.  Dispense: 30 tablet; Refill: 4    2. Attention deficit hyperactivity disorder (ADHD), predominantly inattentive type  -     atomoxetine (Strattera) 80 MG capsule; Take 1 capsule by mouth Daily.  Dispense: 30 capsule; Refill: 1      Patient still struggling with focus and task completion.  We will increase Strattera to 80 mg daily.  We may have to consider a stimulant if this is not successful.  Patient also having increased anxiety.  We will change medication to Valium 2 mg daily as needed only.  We will follow-up in 3 months at patient's request.  Patient will notify office of the success or not success of the Valium.    A psychological evaluation was conducted in order to assess past and current level of functioning. Areas assessed included, but were not limited to: perception of social support, perception of ability to face and deal with challenges in life (positive functioning), anxiety symptoms, depressive symptoms, perspective on beliefs/belief system, coping skills for stress, intelligence level,  Therapeutic rapport was established. Interventions conducted today were geared towards incorporating medication management along with support for continued therapy. Education was also provided as to the med management with this provider and what to expect in subsequent sessions.    We discussed risks, benefits,goals and side effects of the  above medication and the patient was agreeable with the plan.Patient was educated on the importance of compliance with treatment and follow-up appointments. Patient is aware to contact the [facility] [phone] with any worsening of symptoms. To call for questions or concerns and return early if necessary. Patent is agreeable to go to the Emergency Department or call 911 should they begin SI/HI.     Part of this note may be an electronic transcription/translation of spoken language to printed text using the Dragon Dictation System.    Return in about 3 months (around 4/27/2025) for Follow Up 30 min, Video visit.    Esperanza Turcios, APRN

## 2025-01-28 ENCOUNTER — OFFICE VISIT (OUTPATIENT)
Dept: FAMILY MEDICINE CLINIC | Facility: CLINIC | Age: 45
End: 2025-01-28
Payer: COMMERCIAL

## 2025-01-28 VITALS
TEMPERATURE: 97.2 F | OXYGEN SATURATION: 99 % | HEART RATE: 101 BPM | SYSTOLIC BLOOD PRESSURE: 144 MMHG | DIASTOLIC BLOOD PRESSURE: 88 MMHG | WEIGHT: 176 LBS | BODY MASS INDEX: 32.39 KG/M2 | HEIGHT: 62 IN

## 2025-01-28 DIAGNOSIS — Z12.11 COLON CANCER SCREENING: ICD-10-CM

## 2025-01-28 DIAGNOSIS — I10 ESSENTIAL HYPERTENSION: ICD-10-CM

## 2025-01-28 DIAGNOSIS — L40.9 PSORIASIS: ICD-10-CM

## 2025-01-28 DIAGNOSIS — L40.9 PSORIASIS OF SCALP: ICD-10-CM

## 2025-01-28 DIAGNOSIS — M25.50 ARTHRALGIA, UNSPECIFIED JOINT: ICD-10-CM

## 2025-01-28 DIAGNOSIS — Z00.00 ANNUAL PHYSICAL EXAM: Primary | ICD-10-CM

## 2025-01-28 DIAGNOSIS — R79.89 LOW TSH LEVEL: ICD-10-CM

## 2025-01-28 DIAGNOSIS — R32 URINARY INCONTINENCE, UNSPECIFIED TYPE: ICD-10-CM

## 2025-01-28 PROCEDURE — 99396 PREV VISIT EST AGE 40-64: CPT | Performed by: NURSE PRACTITIONER

## 2025-01-28 RX ORDER — CLOBETASOL PROPIONATE 0.5 MG/ML
SOLUTION TOPICAL 2 TIMES DAILY
Qty: 50 ML | Refills: 0 | Status: SHIPPED | OUTPATIENT
Start: 2025-01-28

## 2025-01-28 RX ORDER — CLOBETASOL PROPIONATE 0.5 MG/G
1 OINTMENT TOPICAL 2 TIMES DAILY
Status: CANCELLED | OUTPATIENT
Start: 2025-01-28

## 2025-01-28 NOTE — PROGRESS NOTES
Chief Complaint   Patient presents with    Annual Exam    Anxiety    Graves' Disease       Patient Care Team:  Head, RAMESH Kam as PCP - General (Nurse Practitioner)  Esperanza Turcios APRN as Nurse Practitioner (Nurse Practitioner)    Chiki Telles is a 45 y.o. female and is here for a yearly physical exam. The patient reports problems - urinary incontinence .    History of Present Illness  The patient presents for a yearly checkup.    She reports experiencing urinary incontinence during episodes of coughing or sneezing, necessitating the daily use of a panty liner. She suspects this may be a side effect of her antihypertensive medication, propranolol, which she has been taking since her last visit. She has not been consistent with Kegel exercises.    She is currently on propranolol for blood pressure management. She does not monitor her blood pressure at home due to anxiety but notes that it tends to be lower when measured outside of the doctor's office. She has lost approximately 9 pounds since her last visit. She maintains an active lifestyle, working at a new apartment complex and engaging in regular walking exercises. She does not adhere to any specific diet.    She has been experiencing left thumb pain.  She is right-handed and her job involves extensive typing. She also reports left hip pain, which she believes is due to her mattress.    She continues to experience psoriasis on her hairline, a condition diagnosed several years ago. She was prescribed steroids but chose not to take them. The psoriasis has remained localized to her hairline and another area to right elbow.  Has not spread to other areas.    She has been supplementing her diet with vitamin D and K12, and has recently started taking an organ meat supplement. She has been feeling thin for the past 3 weeks. She does not take a multivitamin.    She has not received the colonoscopy packet that was supposed to be sent to  her. She has not been to the dentist twice a year due to anxiety. She is not up to date on eye exams. She has vision issues. She needs to make a GYN appointment as it has been longer than a year.    She was referred to a psychiatrist for anxiety management, which is now well-controlled. She reports feeling better than ever before. She no longer experiences chest palpitations.    SOCIAL HISTORY  She works at a new apartment complex.    MEDICATIONS  Current: propranolol, vitamin D, K12 supplement    Results  Laboratory Studies  Hepatitis C screen was negative. Vitamin D is on the low end of normal. Folate was down a little at 2.4. B12 is normal at 596. TSH level is low. Free T4 is normal at 1.73. Total cholesterol was 204. LDL was 115. HDL was 50. Fasting glucose is 90. Kidney function is normal. Liver function test is slightly elevated. White count is normal. Hemoglobin is 13.9.    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       The following portions of the patient's history were reviewed and updated as appropriate: allergies, current medications, past family history, past medical history, past social history, past surgical history, and problem list.    Social and Family and Surgical History reviewed and updated today, see Rooming tab.    Health History, Preventive Measures and Vaccination flow sheets reviewed and updated today.    Patient's current medical chart in Epic; including previous office notes, imaging, labs, specialist's evaluation either in notes or in Media tab reviewed today.    Other pertinent medical information also reviewed thru Care Everywhere function is also reviewed today.    Review of Systems  Review of Systems  Vitals:    01/28/25 1445   BP: 144/88   BP Location:  "Left arm   Patient Position: Sitting   Cuff Size: Adult   Pulse: 101   Temp: 97.2 °F (36.2 °C)   SpO2: 99%   Weight: 79.8 kg (176 lb)   Height: 157.5 cm (62\")     Wt Readings from Last 3 Encounters:   01/28/25 79.8 kg (176 lb)   01/26/24 83.9 kg (185 lb)   06/28/23 79.4 kg (175 lb)       General Appearance:  Alert, cooperative, no distress, appears stated age   Head:  Normocephalic, without obvious abnormality, atraumatic   Eyes:  PERRL, conjunctiva/corneas clear, EOM's intact.   Ears:  Normal TM's and external ear canals, both ears   Nose: Nares normal, septum midline, mucosa normal, no drainage or sinus tenderness   Throat: Lips, mucosa, and tongue normal; teeth and gums normal   Neck: Supple, symmetrical, trachea midline, no adenopathy;   thyroid: No enlargement/tenderness/nodules       Lungs:  Clear to auscultation bilaterally, respirations even and unlabored   Chest wall:  No tenderness or deformity   Heart:  Regular rate and rhythm, S1 and S2 normal, no murmur, rub or gallop   Abdomen:  Soft, non-tender, bowel sounds active all four quadrants,   no masses, no organomegaly           Extremities: Extremities normal except tenderness to left thumb 1st joint.  Noted popping with flexion.   atraumatic, no cyanosis or edema   Pulses: 2+ and symmetric all extremities   Skin: Skin color, texture, turgor normal, scaly erythema rash to right elbow measuring about 3 cm.  Also noted erythema rash to hairline on back of head on left side.     Lymph nodes: Cervical and supraclavicular nodes normal   Neurologic: CNII-XII intact. Normal strength.       BMI is >= 30 and <35. (Class 1 Obesity). The following options were offered after discussion;: exercise counseling/recommendations, nutrition counseling/recommendations, and information on healthy weight added to patient's after visit summary        The 10-year ASCVD risk score (Shiraz FLETCHER, et al., 2019) is: 0.9%    Values used to calculate the score:      Age: 45 years      " Sex: Female      Is Non- : No      Diabetic: No      Tobacco smoker: No      Systolic Blood Pressure: 144 mmHg      Is BP treated: Yes      HDL Cholesterol: 77 mg/dL      Total Cholesterol: 204 mg/dL     Results for orders placed or performed in visit on 01/20/25   HCV Antibody Rfx To Qnt PCR    Collection Time: 01/21/25  8:34 AM    Specimen: Blood   Result Value Ref Range    Hepatitis C Ab Non Reactive Non Reactive   Vitamin D,25-Hydroxy    Collection Time: 01/21/25  8:34 AM    Specimen: Blood   Result Value Ref Range    25 Hydroxy, Vitamin D 31.3 30.0 - 100.0 ng/mL   Vitamin B12 & Folate    Collection Time: 01/21/25  8:34 AM    Specimen: Blood   Result Value Ref Range    Vitamin B-12 596 232 - 1,245 pg/mL    Folate 2.4 (L) >3.0 ng/mL   TSH Rfx On Abnormal To Free T4    Collection Time: 01/21/25  8:34 AM    Specimen: Blood   Result Value Ref Range    TSH 0.061 (L) 0.450 - 4.500 uIU/mL   Lipid Panel With / Chol / HDL Ratio    Collection Time: 01/21/25  8:34 AM    Specimen: Blood   Result Value Ref Range    Total Cholesterol 204 (H) 100 - 199 mg/dL    Triglycerides 64 0 - 149 mg/dL    HDL Cholesterol 77 >39 mg/dL    VLDL Cholesterol Suhas 12 5 - 40 mg/dL    LDL Chol Calc (NIH) 115 (H) 0 - 99 mg/dL    Chol/HDL Ratio 2.6 0.0 - 4.4 ratio   Comprehensive Metabolic Panel    Collection Time: 01/21/25  8:34 AM    Specimen: Blood   Result Value Ref Range    Glucose 95 70 - 99 mg/dL    BUN 10 6 - 24 mg/dL    Creatinine 0.66 0.57 - 1.00 mg/dL    EGFR Result 109 >59 mL/min/1.73    BUN/Creatinine Ratio 15 9 - 23    Sodium 140 134 - 144 mmol/L    Potassium 4.4 3.5 - 5.2 mmol/L    Chloride 102 96 - 106 mmol/L    Total CO2 25 20 - 29 mmol/L    Calcium 9.1 8.7 - 10.2 mg/dL    Total Protein 6.4 6.0 - 8.5 g/dL    Albumin 4.3 3.9 - 4.9 g/dL    Globulin 2.1 1.5 - 4.5 g/dL    Total Bilirubin 0.4 0.0 - 1.2 mg/dL    Alkaline Phosphatase 71 44 - 121 IU/L    AST (SGOT) 28 0 - 40 IU/L    ALT (SGPT) 37 (H) 0 - 32 IU/L    CBC (No Diff)    Collection Time: 01/21/25  8:34 AM    Specimen: Blood   Result Value Ref Range    WBC 7.1 3.4 - 10.8 x10E3/uL    RBC 4.01 3.77 - 5.28 x10E6/uL    Hemoglobin 13.9 11.1 - 15.9 g/dL    Hematocrit 41.2 34.0 - 46.6 %     (H) 79 - 97 fL    MCH 34.7 (H) 26.6 - 33.0 pg    MCHC 33.7 31.5 - 35.7 g/dL    RDW 11.7 11.7 - 15.4 %    Platelets 292 150 - 450 x10E3/uL   T4F    Collection Time: 01/21/25  8:34 AM   Result Value Ref Range    Free T4 1.73 0.82 - 1.77 ng/dL   Hepatitis C Virus Interpretation    Collection Time: 01/21/25  8:34 AM   Result Value Ref Range    Interpretation Comment      Assessment & Plan     Assessment & Plan  1. Urinary incontinence.  The urinary incontinence could be attributed to pelvic floor dysfunction. She is advised to perform Kegel exercises consistently. A urine test will be conducted today to rule out any underlying infection. If the urine test is negative and Kegel exercises do not improve symptoms, a change in medication may be considered.    2. Hypertension.  Her blood pressure has shown improvement since the last visit, now at 144/88 compared to 158/100 previously. She is currently on propranolol, which is being used to manage both her hypertension and anxiety. She is advised to continue monitoring her blood pressure at home and report any unusual symptoms such as heart palpitations, headaches, or leg swelling.    3. Thumb pain.  The thumb pain could be indicative of early arthritis or nerve compression. She is advised to soak her hand in warm Epsom salts and apply topical Voltaren gel 4 times daily prn. If the pain persists, she may consider using a brace for support and applying ice to the affected area.    4. Psoriasis.  She is advised to apply the clobetasol to the affected area and monitor for improvement over the next 2 weeks. If there is no improvement, a referral to dermatology will be considered.    5. Health maintenance.  Her hepatitis C screen was negative,  indicating no need for future testing. Her vitamin D levels have improved to within the normal range. Her folate levels are slightly decreased at 2.4, but her B12 levels are normal. Her TSH levels are low, suggesting an overuse of thyroid medication leading to hyperthyroidism. Her cholesterol levels have remained stable compared to the previous year, with a total cholesterol of 204 and an LDL of 115. Her fasting glucose levels are the best they have been in recent years. Her kidney function is normal, and her liver function tests are slightly elevated. Her white count is normal, and her hemoglobin is 13.9. She is advised to continue her daily intake of vitamin D and K12 supplements. She is also advised to take folic acid supplements to boost her folate levels. She is advised to take her thyroid medication on an empty stomach and not to consume anything for at least 30 minutes afterward. A repeat thyroid function test will be scheduled in 6 to 8 weeks. A referral for a colonoscopy will be made again.    6. Anxiety.  She is currently on propranolol, which is being used to manage both her hypertension and anxiety. She reports that her anxiety is completely under control and feels better than she ever has in her entire life.    Follow-up  The patient will follow up in 1 year for her annual checkup.    Diagnoses and all orders for this visit:    1. Annual physical exam (Primary)    2. Arthralgia, unspecified joint  -     Diclofenac Sodium (VOLTAREN) 1 % gel gel; Apply 4 g topically to the appropriate area as directed 4 (Four) Times a Day As Needed (joint pain).  Dispense: 100 g; Refill: 0    3. Psoriasis of scalp    4. Psoriasis  -     clobetasol (TEMOVATE) 0.05 % external solution; Apply  topically to the appropriate area as directed 2 (Two) Times a Day.  Dispense: 50 mL; Refill: 0    5. Urinary incontinence, unspecified type  -     UA / M With / Rflx Culture([facility] ONLY) - Urine, Clean Catch    6. Low TSH level  -      T3, Free; Future  -     T4, Free; Future  -     TSH; Future  -     Thyroid Peroxidase Antibody; Future        1. Patient Counseling:  --Nutrition: Stressed importance of moderation in sodium/caffeine intake, saturated fat and cholesterol.  Discussed caloric balance, sufficient intake of fresh fruits, vegetables, fiber,    calcium, iron.  --Exercise: Stressed the importance of regular exercise.   --Substance Abuse: Discussed cessation/primary prevention of tobacco, alcohol, or other drug use; driving or other dangerous activities under the influence.    --Dental health: Discussed importance of regular tooth brushing, flossing, and dental visits.  --Suggested having eyes and vision checked if needed or past due.  --Immunizations reviewed.  --Discussed benefits of screening colonoscopy.  2. Discussed the patient's BMI with her.  The BMI is above average; BMI management plan is completed  3. Follow up next physical in 1 year    Patient was given instructions and counseling regarding condition or for health maintenance advice.  Please see specific information pulled into the AVS if appropriate.      Medications Discontinued During This Encounter   Medication Reason    famotidine (Pepcid) 20 MG tablet *Therapy completed    MAGNESIUM CHLORIDE PO *Therapy completed    cyclobenzaprine (FLEXERIL) 5 MG tablet *Therapy completed          Patient or patient representative verbalized consent for the use of Ambient Listening during the visit with  RAMESH Norwood for chart documentation. 1/28/2025  16:39 RAMESH Moss  [facility]  [facility] [place]

## 2025-01-29 LAB
APPEARANCE UR: CLEAR
BACTERIA #/AREA URNS HPF: NORMAL /[HPF]
BILIRUB UR QL STRIP: NEGATIVE
CASTS URNS QL MICRO: NORMAL /LPF
COLOR UR: YELLOW
EPI CELLS #/AREA URNS HPF: NORMAL /HPF (ref 0–10)
GLUCOSE UR QL STRIP: NEGATIVE
HGB UR QL STRIP: NEGATIVE
KETONES UR QL STRIP: NEGATIVE
LEUKOCYTE ESTERASE UR QL STRIP: NEGATIVE
MICRO URNS: NORMAL
MICRO URNS: NORMAL
NITRITE UR QL STRIP: NEGATIVE
PH UR STRIP: 7.5 [PH] (ref 5–7.5)
PROT UR QL STRIP: NEGATIVE
RBC #/AREA URNS HPF: NORMAL /HPF (ref 0–2)
SP GR UR STRIP: 1.01 (ref 1–1.03)
URINALYSIS REFLEX: NORMAL
UROBILINOGEN UR STRIP-MCNC: 0.2 MG/DL (ref 0.2–1)
WBC #/AREA URNS HPF: NORMAL /HPF (ref 0–5)

## 2025-02-13 DIAGNOSIS — E05.00 GRAVES DISEASE: ICD-10-CM

## 2025-02-14 RX ORDER — LEVOTHYROXINE SODIUM 112 UG/1
112 TABLET ORAL DAILY
Qty: 90 TABLET | Refills: 2 | Status: SHIPPED | OUTPATIENT
Start: 2025-02-14

## 2025-03-07 DIAGNOSIS — E66.811 CLASS 1 OBESITY DUE TO EXCESS CALORIES WITHOUT SERIOUS COMORBIDITY WITH BODY MASS INDEX (BMI) OF 32.0 TO 32.9 IN ADULT: Primary | ICD-10-CM

## 2025-03-07 DIAGNOSIS — E66.09 CLASS 1 OBESITY DUE TO EXCESS CALORIES WITHOUT SERIOUS COMORBIDITY WITH BODY MASS INDEX (BMI) OF 32.0 TO 32.9 IN ADULT: Primary | ICD-10-CM

## 2025-03-14 DIAGNOSIS — E66.811 CLASS 1 OBESITY DUE TO EXCESS CALORIES WITHOUT SERIOUS COMORBIDITY WITH BODY MASS INDEX (BMI) OF 32.0 TO 32.9 IN ADULT: Primary | ICD-10-CM

## 2025-03-14 DIAGNOSIS — E66.09 CLASS 1 OBESITY DUE TO EXCESS CALORIES WITHOUT SERIOUS COMORBIDITY WITH BODY MASS INDEX (BMI) OF 32.0 TO 32.9 IN ADULT: Primary | ICD-10-CM

## 2025-03-14 RX ORDER — TIRZEPATIDE 2.5 MG/.5ML
2.5 INJECTION, SOLUTION SUBCUTANEOUS WEEKLY
Qty: 2 ML | Refills: 0 | Status: SHIPPED | OUTPATIENT
Start: 2025-03-14

## 2025-03-23 DIAGNOSIS — F41.1 GAD (GENERALIZED ANXIETY DISORDER): ICD-10-CM

## 2025-03-23 DIAGNOSIS — F90.0 ATTENTION DEFICIT HYPERACTIVITY DISORDER (ADHD), PREDOMINANTLY INATTENTIVE TYPE: ICD-10-CM

## 2025-03-24 RX ORDER — DESVENLAFAXINE 100 MG/1
100 TABLET, EXTENDED RELEASE ORAL DAILY
Qty: 30 TABLET | Refills: 4 | Status: SHIPPED | OUTPATIENT
Start: 2025-03-24

## 2025-03-24 RX ORDER — ATOMOXETINE 80 MG/1
80 CAPSULE ORAL DAILY
Qty: 30 CAPSULE | Refills: 1 | Status: SHIPPED | OUTPATIENT
Start: 2025-03-24

## 2025-04-28 ENCOUNTER — TELEMEDICINE (OUTPATIENT)
Dept: PSYCHIATRY | Facility: CLINIC | Age: 45
End: 2025-04-28
Payer: COMMERCIAL

## 2025-04-28 DIAGNOSIS — F41.1 GAD (GENERALIZED ANXIETY DISORDER): Primary | ICD-10-CM

## 2025-04-28 DIAGNOSIS — F90.0 ATTENTION DEFICIT HYPERACTIVITY DISORDER (ADHD), PREDOMINANTLY INATTENTIVE TYPE: ICD-10-CM

## 2025-04-28 PROCEDURE — 99214 OFFICE O/P EST MOD 30 MIN: CPT | Performed by: NURSE PRACTITIONER

## 2025-04-28 RX ORDER — DIAZEPAM 2 MG/1
2 TABLET ORAL DAILY PRN
Qty: 30 TABLET | Refills: 3 | Status: SHIPPED | OUTPATIENT
Start: 2025-04-28 | End: 2026-04-28

## 2025-04-28 RX ORDER — ATOMOXETINE 100 MG/1
100 CAPSULE ORAL DAILY
Qty: 30 CAPSULE | Refills: 3 | Status: SHIPPED | OUTPATIENT
Start: 2025-04-28

## 2025-04-28 NOTE — PROGRESS NOTES
Patient Name: Nazanin Telles  MRN: [id number]   :  1980     This provider is located at her home office through the [facility] (through [facility]), [address], [address] using a secure [employer] Video Visit through [employer]. Patient is being seen remotely via telehealth at their home address in [place], and stated they are in a secure environment for this session. The patient's condition being diagnosed/treated is appropriate for telemedicine. The provider identified herself as well as her credentials.   The patient, and/or patients guardian, consent to be seen remotely, and when consent is given they understand that the consent allows for patient identifiable information to be sent to a third party as needed.   They may refuse to be seen remotely at any time. The electronic data is encrypted and password protected, and the patient and/or guardian has been advised of the potential risks to privacy not withstanding such measures.    You have chosen to receive care through a telehealth visit.  Do you consent to use a video/audio connection for your medical care today? Yes    Chief Complaint:      ICD-10-CM ICD-9-CM   1. GETACHEW (generalized anxiety disorder)  F41.1 300.02   2. Attention deficit hyperactivity disorder (ADHD), predominantly inattentive type  F90.0 314.00       History of Present Illness: Nazanin Telles is a 45 y.o. female is here today for medication management follow up.  Patient feels anxiety is managed.  Patient struggling with focus and task completion.  Wondering about an increase in Strattera.    The following portions of the patient's history were reviewed and updated as appropriate: allergies, current medications, past family history, past medical history, past social history, past surgical history, and problem list.    Review of Systems;;  Review of Systems   Constitutional:  Negative for activity change, appetite change,  fatigue, unexpected weight gain and unexpected weight loss.   Respiratory:  Negative for shortness of breath and wheezing.    Gastrointestinal:  Negative for constipation, diarrhea, nausea and vomiting.   Musculoskeletal:  Negative for gait problem.   Skin:  Negative for dry skin and rash.   Neurological:  Negative for dizziness, speech difficulty, weakness, light-headedness, headache, memory problem and confusion.   Psychiatric/Behavioral:  Positive for decreased concentration. Negative for agitation, behavioral problems, dysphoric mood, hallucinations, self-injury, sleep disturbance, suicidal ideas, negative for hyperactivity, depressed mood and stress. The patient is not nervous/anxious.        Physical Exam;;  Physical Exam  Constitutional:       General: She is not in acute distress.     Appearance: She is well-developed. She is not diaphoretic.   HENT:      Head: Normocephalic and atraumatic.   Eyes:      Conjunctiva/sclera: Conjunctivae normal.   Pulmonary:      Effort: Pulmonary effort is normal. No respiratory distress.   Musculoskeletal:         General: Normal range of motion.      Cervical back: Full passive range of motion without pain and normal range of motion.   Neurological:      Mental Status: She is alert and oriented to person, place, and time.   Psychiatric:         Attention and Perception: Attention and perception normal.         Mood and Affect: Mood and affect normal. Mood is not anxious or depressed. Affect is not labile, blunt, angry or inappropriate.         Speech: Speech normal. Speech is not rapid and pressured or tangential.         Behavior: Behavior normal. Behavior is not agitated, slowed, aggressive, withdrawn, hyperactive or combative. Behavior is cooperative.         Thought Content: Thought content normal. Thought content is not paranoid or delusional. Thought content does not include homicidal or suicidal ideation. Thought content does not include homicidal or suicidal plan.          Cognition and Memory: Cognition and memory normal.         Judgment: Judgment normal.       not currently breastfeeding.  There is no height or weight on file to calculate BMI. Video appt unable to obtain.      Current Medications;;    Current Outpatient Medications:     ALPRAZolam (Xanax) 0.5 MG tablet, Take 1 tablet by mouth Daily As Needed for Anxiety., Disp: 30 tablet, Rfl: 2    atomoxetine (STRATTERA) 80 MG capsule, TAKE 1 CAPSULE BY MOUTH DAILY, Disp: 30 capsule, Rfl: 1    clobetasol (TEMOVATE) 0.05 % external solution, Apply  topically to the appropriate area as directed 2 (Two) Times a Day., Disp: 50 mL, Rfl: 0    desvenlafaxine (PRISTIQ) 100 MG 24 hr tablet, TAKE 1 TABLET BY MOUTH DAILY, Disp: 30 tablet, Rfl: 4    diazePAM (Valium) 2 MG tablet, Take 1 tablet by mouth Daily As Needed for Anxiety., Disp: 14 tablet, Rfl: 0    Diclofenac Sodium (VOLTAREN) 1 % gel gel, Apply 4 g topically to the appropriate area as directed 4 (Four) Times a Day As Needed (joint pain)., Disp: 100 g, Rfl: 0    levothyroxine (SYNTHROID, LEVOTHROID) 112 MCG tablet, TAKE 1 TABLET BY MOUTH DAILY, Disp: 90 tablet, Rfl: 2    melatonin 5 MG tablet tablet, Take 4 tablets by mouth Every Night., Disp: , Rfl:     propranolol (INDERAL) 20 MG tablet, Take 1 tablet by mouth 3 (Three) Times a Day. (Patient taking differently: Take 1 tablet by mouth 2 (Two) Times a Day.), Disp: 270 tablet, Rfl: 3    Tirzepatide-Weight Management (Zepbound) 2.5 MG/0.5ML solution, Inject 0.5 mL under the skin into the appropriate area as directed 1 (One) Time Per Week., Disp: 2 mL, Rfl: 0    Lab Results:   No visits with results within 3 Month(s) from this visit.   Latest known visit with results is:   Office Visit on 01/28/2025   Component Date Value Ref Range Status    Specific Gravity, UA 01/28/2025 1.007  1.005 - 1.030 Final    pH,  01/28/2025 7.5  5.0 - 7.5 Final    Color,  01/28/2025 Yellow  Yellow Final    Appearance,  01/28/2025 Clear  Clear  Final    Leukocytes, UA 01/28/2025 Negative  Negative Final    Protein 01/28/2025 Negative  Negative/Trace Final    Glucose, UA 01/28/2025 Negative  Negative Final    Ketones 01/28/2025 Negative  Negative Final    Blood, UA 01/28/2025 Negative  Negative Final    Bilirubin, UA 01/28/2025 Negative  Negative Final    Urobilinogen, UA 01/28/2025 0.2  0.2 - 1.0 mg/dL Final    Nitrite, UA 01/28/2025 Negative  Negative Final    Microscopic Examination 01/28/2025 Comment   Final    Microscopic follows if indicated.    Microscopic Examination 01/28/2025 See below:   Final    Microscopic was indicated and was performed.    Urinalysis Reflex 01/28/2025 Comment   Final    This specimen will not reflex to a Urine Culture.    WBC, UA 01/28/2025 None seen  0 - 5 /hpf Final    RBC, UA 01/28/2025 None seen  0 - 2 /hpf Final    Epithelial Cells (non renal) 01/28/2025 0-10  0 - 10 /hpf Final    Casts 01/28/2025 None seen  None seen /lpf Final    Bacteria, UA 01/28/2025 None seen  None seen/Few Final       Mental Status Exam:   Hygiene:   good  Cooperation:  Cooperative  Eye Contact:  Good  Psychomotor Behavior:  Appropriate  Mood:anxious  Affect:  Appropriate  Hopelessness: Denies  Speech:  Normal  Thought Process:  Goal directed  Thought Content:  Normal  Suicidal:  None  Homicidal:  None  Hallucinations:  None  Delusion:  None  Memory:  Intact  Orientation:  Person, Place, Time, and Situation  Reliability:  good  Insight:  Good  Judgement:  Good  Impulse Control:  Good    PHQ-9 Depression Screening  Little interest or pleasure in doing things?     Feeling down, depressed, or hopeless?     PHQ-2 Total Score     Trouble falling or staying asleep, or sleeping too much?     Feeling tired or having little energy?     Poor appetite or overeating?     Feeling bad about yourself - or that you are a failure or have let yourself or your family down?     Trouble concentrating on things, such as reading the newspaper or watching television?      Moving or speaking so slowly that other people could have noticed? Or the opposite - being so fidgety or restless that you have been moving around a lot more than usual?     Thoughts that you would be better off dead, or of hurting yourself in some way?     PHQ-9 Total Score     If you checked off any problems, how difficult have these problems made it for you to do your work, take care of things at home, or get along with other people?       NEXT UDS DUE: 4/28/25 order sent to be completed by next appt.       Assessment/Plan:  Diagnoses and all orders for this visit:    1. GETACHEW (generalized anxiety disorder) (Primary)  -     Urine Drug Screen - Urine, Clean Catch; Future  -     diazePAM (Valium) 2 MG tablet; Take 1 tablet by mouth Daily As Needed for Anxiety.  Dispense: 30 tablet; Refill: 3    2. Attention deficit hyperactivity disorder (ADHD), predominantly inattentive type  -     Urine Drug Screen - Urine, Clean Catch; Future  -     atomoxetine (Strattera) 100 MG capsule; Take 1 capsule by mouth Daily.  Dispense: 30 capsule; Refill: 3      Patient struggling some with focus.  We will increase Strattera to 100 mg daily.  Patient feels comfortable following up in 3 months.      We discussed risks, benefits,goals and side effects of the above medication and the patient was agreeable with the plan.Patient was educated on the importance of compliance with treatment and follow-up appointments. Patient is aware to contact the [facility] [phone] with any worsening of symptoms. To call for questions or concerns and return early if necessary. Patent is agreeable to go to the Emergency Department or call 911 should they begin SI/HI.     Part of this note may be an electronic transcription/translation of spoken language to printed text using the Dragon Dictation System.    Return in about 3 months (around 7/28/2025) for Follow Up 30 min, Video visit.    RAMESH Stevens

## 2025-04-29 DIAGNOSIS — E66.811 CLASS 1 OBESITY DUE TO EXCESS CALORIES WITHOUT SERIOUS COMORBIDITY WITH BODY MASS INDEX (BMI) OF 32.0 TO 32.9 IN ADULT: ICD-10-CM

## 2025-04-29 DIAGNOSIS — E66.09 CLASS 1 OBESITY DUE TO EXCESS CALORIES WITHOUT SERIOUS COMORBIDITY WITH BODY MASS INDEX (BMI) OF 32.0 TO 32.9 IN ADULT: ICD-10-CM

## 2025-04-29 RX ORDER — TIRZEPATIDE 2.5 MG/.5ML
2.5 INJECTION, SOLUTION SUBCUTANEOUS WEEKLY
Qty: 2 ML | Refills: 0 | Status: CANCELLED | OUTPATIENT
Start: 2025-04-29

## 2025-05-01 DIAGNOSIS — E66.09 CLASS 1 OBESITY DUE TO EXCESS CALORIES WITHOUT SERIOUS COMORBIDITY WITH BODY MASS INDEX (BMI) OF 32.0 TO 32.9 IN ADULT: ICD-10-CM

## 2025-05-01 DIAGNOSIS — E66.811 CLASS 1 OBESITY DUE TO EXCESS CALORIES WITHOUT SERIOUS COMORBIDITY WITH BODY MASS INDEX (BMI) OF 32.0 TO 32.9 IN ADULT: ICD-10-CM

## 2025-05-01 RX ORDER — TIRZEPATIDE 2.5 MG/.5ML
2.5 INJECTION, SOLUTION SUBCUTANEOUS WEEKLY
Qty: 2 ML | Refills: 0 | Status: SHIPPED | OUTPATIENT
Start: 2025-05-01

## 2025-06-02 DIAGNOSIS — E66.811 CLASS 1 OBESITY DUE TO EXCESS CALORIES WITHOUT SERIOUS COMORBIDITY WITH BODY MASS INDEX (BMI) OF 32.0 TO 32.9 IN ADULT: Primary | ICD-10-CM

## 2025-06-02 DIAGNOSIS — E66.09 CLASS 1 OBESITY DUE TO EXCESS CALORIES WITHOUT SERIOUS COMORBIDITY WITH BODY MASS INDEX (BMI) OF 32.0 TO 32.9 IN ADULT: Primary | ICD-10-CM

## 2025-06-02 RX ORDER — TIRZEPATIDE 5 MG/.5ML
5 INJECTION, SOLUTION SUBCUTANEOUS WEEKLY
Qty: 2 ML | Refills: 0 | Status: SHIPPED | OUTPATIENT
Start: 2025-06-02

## 2025-06-25 DIAGNOSIS — E66.811 CLASS 1 OBESITY DUE TO EXCESS CALORIES WITHOUT SERIOUS COMORBIDITY WITH BODY MASS INDEX (BMI) OF 32.0 TO 32.9 IN ADULT: ICD-10-CM

## 2025-06-25 DIAGNOSIS — E66.09 CLASS 1 OBESITY DUE TO EXCESS CALORIES WITHOUT SERIOUS COMORBIDITY WITH BODY MASS INDEX (BMI) OF 32.0 TO 32.9 IN ADULT: ICD-10-CM

## 2025-06-27 RX ORDER — TIRZEPATIDE 5 MG/.5ML
INJECTION, SOLUTION SUBCUTANEOUS
Qty: 2 ML | Refills: 0 | Status: SHIPPED | OUTPATIENT
Start: 2025-06-27

## 2025-07-20 DIAGNOSIS — F41.9 ANXIETY: ICD-10-CM

## 2025-07-20 DIAGNOSIS — I10 UNCONTROLLED HYPERTENSION: ICD-10-CM

## 2025-07-21 RX ORDER — PROPRANOLOL HCL 20 MG
20 TABLET ORAL 3 TIMES DAILY
Qty: 270 TABLET | Refills: 1 | Status: SHIPPED | OUTPATIENT
Start: 2025-07-21

## 2025-07-28 ENCOUNTER — TELEMEDICINE (OUTPATIENT)
Dept: PSYCHIATRY | Facility: CLINIC | Age: 45
End: 2025-07-28
Payer: COMMERCIAL

## 2025-07-28 DIAGNOSIS — F90.0 ATTENTION DEFICIT HYPERACTIVITY DISORDER (ADHD), PREDOMINANTLY INATTENTIVE TYPE: Primary | ICD-10-CM

## 2025-07-28 DIAGNOSIS — F41.1 GAD (GENERALIZED ANXIETY DISORDER): ICD-10-CM

## 2025-07-28 PROCEDURE — 99214 OFFICE O/P EST MOD 30 MIN: CPT | Performed by: NURSE PRACTITIONER

## 2025-07-28 RX ORDER — ALPRAZOLAM 0.5 MG
0.5 TABLET ORAL DAILY PRN
Qty: 30 TABLET | Refills: 2 | Status: SHIPPED | OUTPATIENT
Start: 2025-07-28 | End: 2026-07-28

## 2025-07-28 NOTE — PROGRESS NOTES
Patient Name: Nazanin Telles  MRN: [id number]   :  1980     This provider is located at her home office through the [facility] (through [facility]), [address], [address] using a secure [employer] Video Visit through [employer]. Patient is being seen remotely via telehealth in [place], and stated they are in a secure environment for this session. The patient's condition being diagnosed/treated is appropriate for telemedicine. The provider identified herself as well as her credentials.   The patient, and/or patients guardian, consent to be seen remotely, and when consent is given they understand that the consent allows for patient identifiable information to be sent to a third party as needed.   They may refuse to be seen remotely at any time. The electronic data is encrypted and password protected, and the patient and/or guardian has been advised of the potential risks to privacy not withstanding such measures.    You have chosen to receive care through a telehealth visit.  Do you consent to use a video/audio connection for your medical care today? Yes    Chief Complaint:      ICD-10-CM ICD-9-CM   1. Attention deficit hyperactivity disorder (ADHD), predominantly inattentive type  F90.0 314.00   2. GETACHEW (generalized anxiety disorder)  F41.1 300.02       History of Present Illness  The patient is a 45-year-old female who presents for a medication management follow-up via telehealth.    She reports that her focus has not significantly improved with Strattera, leading her to consider reducing the dosage. She describes herself as a chronic procrastinator but manages to complete her tasks. She also mentions frequent racing thoughts. She expresses concern about potential side effects of stimulant medications, such as increased heart rate and anxiety, which she believes are already elevated due to her anxiety. She monitors her heart rate using a watch and during  doctor's appointments. She is open to trying a lower dose of Strattera to observe any changes. She still has a supply of Strattera 40 mg from a previous prescription.    Her anxiety has significantly improved with the current treatment regimen. However, she prefers Xanax over Valium as her anxiety tends to peak at night. Despite taking Valium before sleep, she often wakes up around 2:00 AM with a racing heart, which makes her question if there is an underlying cardiac issue. She recalls that while Xanax would induce sleep quickly, it ensured uninterrupted sleep throughout the night. She occasionally forgets to take her morning medications on weekends, which she believes may have contributed to her symptoms this week. She takes propranolol twice daily and Valium at bedtime.    The following portions of the patient's history were reviewed and updated as appropriate: allergies, current medications, past family history, past medical history, past social history, past surgical history, and problem list.    Review of Systems;;  Review of Systems   Constitutional:  Negative for activity change, appetite change, fatigue, unexpected weight gain and unexpected weight loss.   Respiratory:  Negative for shortness of breath and wheezing.    Gastrointestinal:  Negative for constipation, diarrhea, nausea and vomiting.   Musculoskeletal:  Negative for gait problem.   Skin:  Negative for dry skin and rash.   Neurological:  Negative for dizziness, speech difficulty, weakness, light-headedness, headache, memory problem and confusion.   Psychiatric/Behavioral:  Positive for decreased concentration. Negative for agitation, behavioral problems, dysphoric mood, hallucinations, self-injury, sleep disturbance, suicidal ideas, negative for hyperactivity, depressed mood and stress. The patient is nervous/anxious.        Physical Exam;;  Physical Exam  Constitutional:       General: She is not in acute distress.     Appearance: She is  well-developed. She is not diaphoretic.   HENT:      Head: Normocephalic and atraumatic.   Eyes:      Conjunctiva/sclera: Conjunctivae normal.   Pulmonary:      Effort: Pulmonary effort is normal. No respiratory distress.   Musculoskeletal:         General: Normal range of motion.      Cervical back: Full passive range of motion without pain and normal range of motion.   Neurological:      Mental Status: She is alert and oriented to person, place, and time.   Psychiatric:         Attention and Perception: Attention and perception normal.         Mood and Affect: Affect normal. Mood is anxious. Mood is not depressed. Affect is not labile, blunt, angry or inappropriate.         Speech: Speech normal. Speech is not rapid and pressured or tangential.         Behavior: Behavior normal. Behavior is not agitated, slowed, aggressive, withdrawn, hyperactive or combative. Behavior is cooperative.         Thought Content: Thought content normal. Thought content is not paranoid or delusional. Thought content does not include homicidal or suicidal ideation. Thought content does not include homicidal or suicidal plan.         Cognition and Memory: Cognition and memory normal.         Judgment: Judgment normal.       not currently breastfeeding.  There is no height or weight on file to calculate BMI. Video appt unable to obtain.      Current Medications;;    Current Outpatient Medications:     atomoxetine (Strattera) 100 MG capsule, Take 1 capsule by mouth Daily., Disp: 30 capsule, Rfl: 3    clobetasol (TEMOVATE) 0.05 % external solution, Apply  topically to the appropriate area as directed 2 (Two) Times a Day., Disp: 50 mL, Rfl: 0    desvenlafaxine (PRISTIQ) 100 MG 24 hr tablet, TAKE 1 TABLET BY MOUTH DAILY, Disp: 30 tablet, Rfl: 4    diazePAM (Valium) 2 MG tablet, Take 1 tablet by mouth Daily As Needed for Anxiety., Disp: 30 tablet, Rfl: 3    Diclofenac Sodium (VOLTAREN) 1 % gel gel, Apply 4 g topically to the appropriate area  as directed 4 (Four) Times a Day As Needed (joint pain)., Disp: 100 g, Rfl: 0    levothyroxine (SYNTHROID, LEVOTHROID) 112 MCG tablet, TAKE 1 TABLET BY MOUTH DAILY, Disp: 90 tablet, Rfl: 2    melatonin 5 MG tablet tablet, Take 4 tablets by mouth Every Night., Disp: , Rfl:     propranolol (INDERAL) 20 MG tablet, TAKE ONE TABLET BY MOUTH THREE TIMES A DAY, Disp: 270 tablet, Rfl: 1    Zepbound 5 MG/0.5ML solution, INJECT 0.5 ML (5 MG) UNDER THE SKIN ONCE WEEKLY (0.5ML= 50 UNITS), Disp: 2 mL, Rfl: 0    Lab Results:   No visits with results within 3 Month(s) from this visit.   Latest known visit with results is:   Office Visit on 01/28/2025   Component Date Value Ref Range Status    Specific Gravity, UA 01/28/2025 1.007  1.005 - 1.030 Final    pH, UA 01/28/2025 7.5  5.0 - 7.5 Final    Color, UA 01/28/2025 Yellow  Yellow Final    Appearance, UA 01/28/2025 Clear  Clear Final    Leukocytes, UA 01/28/2025 Negative  Negative Final    Protein 01/28/2025 Negative  Negative/Trace Final    Glucose, UA 01/28/2025 Negative  Negative Final    Ketones 01/28/2025 Negative  Negative Final    Blood, UA 01/28/2025 Negative  Negative Final    Bilirubin, UA 01/28/2025 Negative  Negative Final    Urobilinogen, UA 01/28/2025 0.2  0.2 - 1.0 mg/dL Final    Nitrite, UA 01/28/2025 Negative  Negative Final    Microscopic Examination 01/28/2025 Comment   Final    Microscopic follows if indicated.    Microscopic Examination 01/28/2025 See below:   Final    Microscopic was indicated and was performed.    Urinalysis Reflex 01/28/2025 Comment   Final    This specimen will not reflex to a Urine Culture.    WBC, UA 01/28/2025 None seen  0 - 5 /hpf Final    RBC, UA 01/28/2025 None seen  0 - 2 /hpf Final    Epithelial Cells (non renal) 01/28/2025 0-10  0 - 10 /hpf Final    Casts 01/28/2025 None seen  None seen /lpf Final    Bacteria, UA 01/28/2025 None seen  None seen/Few Final       Mental Status Exam:   Hygiene:   good  Cooperation:  Cooperative  Eye  Contact:  Good  Psychomotor Behavior:  Appropriate  Mood:anxious  Affect:  Appropriate  Hopelessness: Denies  Speech:  Normal  Thought Process:  Goal directed  Thought Content:  Normal  Suicidal:  None  Homicidal:  None  Hallucinations:  None  Delusion:  None  Memory:  Intact  Orientation:  Person, Place, Time, and Situation  Reliability:  good  Insight:  Good  Judgement:  Good  Impulse Control:  Good      [facility] Anxiety Behav Health    Question 7/28/2025  1:29 PM EDT - Filed by Patient   In the last 2 weeks, how often have you been bothered by the following problems?    Feeling nervous, anxious, or on edge? Several days   Not being able to sleep or control worrying? More than half the days   Worrying too much about different things? Several days   Trouble relaxing? Several days   Being so restless that it is hard to sit still Several days   Becoming easily annoyed or irritable? Several days   Feeling afraid, as if something awful might happen? Several days   If you checked any problems, how difficult have they made it for you to do your work, take care of things at home, or get along with other people? Somewhat difficult   Over the last 2 weeks, how often have you been bothered by the following problems? (range: 0 - 28) 8         PHQ-9 Depression Screening  Little interest or pleasure in doing things? (Patient-Rptd) Several days   Feeling down, depressed, or hopeless? (Patient-Rptd) Not at all   PHQ-2 Total Score (Patient-Rptd) 1   Trouble falling or staying asleep, or sleeping too much? (Patient-Rptd) Almost all   Feeling tired or having little energy? (Patient-Rptd) Several days   Poor appetite or overeating?     Feeling bad about yourself - or that you are a failure or have let yourself or your family down? (Patient-Rptd) Several days   Trouble concentrating on things, such as reading the newspaper or watching television? (Patient-Rptd) Several days   Moving or speaking so slowly that other people  could have noticed? Or the opposite - being so fidgety or restless that you have been moving around a lot more than usual? (Patient-Rptd) Not at all   Thoughts that you would be better off dead, or of hurting yourself in some way? (Patient-Rptd) Not at all   PHQ-9 Total Score     If you checked off any problems, how difficult have these problems made it for you to do your work, take care of things at home, or get along with other people? (Patient-Rptd) Somewhat difficult          Diagnoses and all orders for this visit:    1. Attention deficit hyperactivity disorder (ADHD), predominantly inattentive type (Primary)    2. GETACHEW (generalized anxiety disorder)  -     ALPRAZolam (Xanax) 0.5 MG tablet; Take 1 tablet by mouth Daily As Needed for Anxiety.  Dispense: 30 tablet; Refill: 2         Assessment & Plan  Problems:  - Attention deficit hyperactivity disorder (ADHD)  - Anxiety    Content of Therapy:  During the session, the patient discussed her experience with Strattera for ADHD, noting minimal improvement and considering a dosage reduction. Concerns about stimulant medications were addressed, particularly regarding potential increases in heart rate and anxiety. Alternatives such as Qelbree were explored. The patient also shared her preference for Xanax over Valium for nighttime anxiety management, citing better sleep quality with Xanax.    Clinical Impression:  The patient reports minimal improvement in ADHD symptoms with the current Strattera dosage. She remains concerned about the potential side effects of stimulant medications, particularly increased heart rate and anxiety. Her anxiety has significantly improved overall, but she experiences nocturnal anxiety that disrupts her sleep. Xanax appears to be more effective for her nighttime anxiety compared to Valium.    Therapeutic Intervention:  The session included discussions on medication management, specifically the potential reduction of Strattera dosage and the  introduction of Qelbree as an alternative nonstimulant ADHD medication. For anxiety management, the patient will switch back to Xanax 0.5 mg once daily as needed.    Plan:  - Reduce Strattera dosage to 80 mg for a few days to assess effectiveness.  - Resume Xanax 0.5 mg once daily as needed.  - Prescription for Xanax to be sent to pharmacy.    Follow-up:  A follow-up appointment is scheduled for 11/03/2025 at 2:30 PM.    Notes & Risk Factors:  *      We discussed risks, benefits,goals and side effects of the above medication and the patient was agreeable with the plan.Patient was educated on the importance of compliance with treatment and follow-up appointments. Patient is aware to contact the [facility] [phone] with any worsening of symptoms. To call for questions or concerns and return early if necessary. Patent is agreeable to go to the Emergency Department or call 911 should they begin SI/HI.     Patient or patient representative verbalized consent for the use of Ambient Listening during the visit with  RAMESH Montoya for chart documentation. 7/28/2025  13:52 EDT    Part of this note may be an electronic transcription/translation of spoken language to printed text using the Dragon Dictation System.        RAMESH Stevens

## 2025-08-11 DIAGNOSIS — E66.811 CLASS 1 OBESITY DUE TO EXCESS CALORIES WITHOUT SERIOUS COMORBIDITY WITH BODY MASS INDEX (BMI) OF 32.0 TO 32.9 IN ADULT: ICD-10-CM

## 2025-08-11 DIAGNOSIS — E66.09 CLASS 1 OBESITY DUE TO EXCESS CALORIES WITHOUT SERIOUS COMORBIDITY WITH BODY MASS INDEX (BMI) OF 32.0 TO 32.9 IN ADULT: ICD-10-CM

## 2025-08-11 RX ORDER — TIRZEPATIDE 5 MG/.5ML
INJECTION, SOLUTION SUBCUTANEOUS
Qty: 2 ML | Refills: 0 | Status: SHIPPED | OUTPATIENT
Start: 2025-08-11

## (undated) DEVICE — SUT ETHLN 3/0 PS2 18 IN 1669H

## (undated) DEVICE — BNDG ELAS MATRX V/CLS 4IN 5YD LF

## (undated) DEVICE — SUT MNCRYL 2/0 SH 27IN Y417H

## (undated) DEVICE — PATIENT RETURN ELECTRODE, SINGLE-USE, CONTACT QUALITY MONITORING, ADULT, WITH 9FT CORD, FOR PATIENTS WEIGING OVER 33LBS. (15KG): Brand: MEGADYNE

## (undated) DEVICE — DRSNG GZ PETROLTM XEROFORM CURAD 1X8IN STRL

## (undated) DEVICE — TRAP FLD MINIVAC MEGADYNE 100ML

## (undated) DEVICE — SOL ISO/ALC RUB 70PCT 4OZ

## (undated) DEVICE — BOWL PLSTC LG 32OZ BLU STRL

## (undated) DEVICE — GLV SURG SENSICARE PI LF PF 7.5

## (undated) DEVICE — BNDG COBAN S/ADHR WRP LF 6IN 5YD TN

## (undated) DEVICE — GLV SURG SENSICARE PI LF PF 8 GRN STRL

## (undated) DEVICE — GAUZE,SPONGE,4"X4",16PLY,STRL,LF,10/TRAY: Brand: MEDLINE

## (undated) DEVICE — DISPOSABLE TOURNIQUET CUFF SINGLE BLADDER, SINGLE PORT AND QUICK CONNECT CONNECTOR: Brand: COLOR CUFF

## (undated) DEVICE — 3M™ STERI-DRAPE™ U-DRAPE 1015: Brand: STERI-DRAPE™

## (undated) DEVICE — BNDG ELAS ELITE V/CLOSE 6IN 5YD LF STRL

## (undated) DEVICE — PK BASIC ORTHO 90

## (undated) DEVICE — PAD UNDERCAST WYTEX 4IN 4YD LF STRL

## (undated) DEVICE — PERI-LOC K-WIRE 2.0MM X 150MM                                    LENGTH TROCAR POINT
Type: IMPLANTABLE DEVICE | Site: ANKLE | Status: NON-FUNCTIONAL
Brand: PERI-LOC
Removed: 2022-12-30

## (undated) DEVICE — EVOS SMALL 2.5MM DRILL W/AO QC SHORT: Brand: EVOS

## (undated) DEVICE — SOL IRR H2O BTL 1000ML STRL

## (undated) DEVICE — BNDG ESMARK 6INX9FT STRL

## (undated) DEVICE — PAD UNDERCAST WYTEX 6IN 4YD LF STRL

## (undated) DEVICE — INTENDED FOR TISSUE SEPARATION, AND OTHER PROCEDURES THAT REQUIRE A SHARP SURGICAL BLADE TO PUNCTURE OR CUT.: Brand: BARD-PARKER ® STAINLESS STEEL BLADES

## (undated) DEVICE — SYR CONTRL PRESS/LO FIX/M/LL W/THMB/RNG 10ML

## (undated) DEVICE — DRP C/ARM 41X74IN

## (undated) DEVICE — RL COTN ABSORB/COMPR 1/2LB 6IN 13FT 1PU

## (undated) DEVICE — DRP C/ARMOR

## (undated) DEVICE — TB SXN FRAZIER 10F STRL

## (undated) DEVICE — 2.5MM PROVISIONAL FIXATION PIN - 14MM: Brand: EVOS

## (undated) DEVICE — BNDG ESMARK STRL 6INX12FT LF

## (undated) DEVICE — SPNG GZ WOVN 4X4IN 12PLY 10/BX STRL

## (undated) DEVICE — TUBING, SUCTION, 1/4" X 12', STRAIGHT: Brand: MEDLINE

## (undated) DEVICE — DRSNG PAD ABD 8X10IN STRL

## (undated) DEVICE — BNDG ELAS ELITE V/CLOSE 4IN 5YD LF STRL

## (undated) DEVICE — SUT VIC 3/0 SH CR8 18IN J864D

## (undated) DEVICE — SPLNT 1 STEP 3X35IN

## (undated) DEVICE — WEBRIL* CAST PADDING: Brand: DEROYAL

## (undated) DEVICE — FRAZIER SUCTION INSTRUMENT 10 FR W/CONTROL VENT & OBTURATOR: Brand: FRAZIER

## (undated) DEVICE — TBG PENCL TELESCP MEGADYNE SMOKE EVAC 10FT

## (undated) DEVICE — NDL HYPO SFTY GLD 22G 1 1/2IN

## (undated) DEVICE — APPL CHLORAPREP HI/LITE 26ML ORNG